# Patient Record
Sex: MALE | Race: WHITE | NOT HISPANIC OR LATINO | Employment: OTHER | URBAN - NONMETROPOLITAN AREA
[De-identification: names, ages, dates, MRNs, and addresses within clinical notes are randomized per-mention and may not be internally consistent; named-entity substitution may affect disease eponyms.]

---

## 2023-05-15 LAB
ALANINE AMINOTRANSFERASE (SGPT) (U/L) IN SER/PLAS: 22 U/L (ref 10–52)
ALBUMIN (G/DL) IN SER/PLAS: 4.3 G/DL (ref 3.4–5)
ALKALINE PHOSPHATASE (U/L) IN SER/PLAS: 60 U/L (ref 33–136)
ANION GAP IN SER/PLAS: 15 MMOL/L (ref 10–20)
ASPARTATE AMINOTRANSFERASE (SGOT) (U/L) IN SER/PLAS: 23 U/L (ref 9–39)
BASOPHILS (10*3/UL) IN BLOOD BY AUTOMATED COUNT: 0.04 X10E9/L (ref 0–0.1)
BASOPHILS/100 LEUKOCYTES IN BLOOD BY AUTOMATED COUNT: 0.6 % (ref 0–2)
BILIRUBIN TOTAL (MG/DL) IN SER/PLAS: 0.5 MG/DL (ref 0–1.2)
CALCIUM (MG/DL) IN SER/PLAS: 9.3 MG/DL (ref 8.6–10.3)
CARBON DIOXIDE, TOTAL (MMOL/L) IN SER/PLAS: 27 MMOL/L (ref 21–32)
CHLORIDE (MMOL/L) IN SER/PLAS: 101 MMOL/L (ref 98–107)
CHOLESTEROL (MG/DL) IN SER/PLAS: 129 MG/DL (ref 0–199)
CHOLESTEROL IN HDL (MG/DL) IN SER/PLAS: 53.8 MG/DL
CHOLESTEROL/HDL RATIO: 2.4
CREATININE (MG/DL) IN SER/PLAS: 0.81 MG/DL (ref 0.5–1.3)
EOSINOPHILS (10*3/UL) IN BLOOD BY AUTOMATED COUNT: 0.16 X10E9/L (ref 0–0.7)
EOSINOPHILS/100 LEUKOCYTES IN BLOOD BY AUTOMATED COUNT: 2.3 % (ref 0–6)
ERYTHROCYTE DISTRIBUTION WIDTH (RATIO) BY AUTOMATED COUNT: 13.5 % (ref 11.5–14.5)
ERYTHROCYTE MEAN CORPUSCULAR HEMOGLOBIN CONCENTRATION (G/DL) BY AUTOMATED: 33.7 G/DL (ref 32–36)
ERYTHROCYTE MEAN CORPUSCULAR VOLUME (FL) BY AUTOMATED COUNT: 92 FL (ref 80–100)
ERYTHROCYTES (10*6/UL) IN BLOOD BY AUTOMATED COUNT: 4.53 X10E12/L (ref 4.5–5.9)
GFR MALE: >90 ML/MIN/1.73M2
GLUCOSE (MG/DL) IN SER/PLAS: 71 MG/DL (ref 74–99)
HEMATOCRIT (%) IN BLOOD BY AUTOMATED COUNT: 41.9 % (ref 41–52)
HEMOGLOBIN (G/DL) IN BLOOD: 14.1 G/DL (ref 13.5–17.5)
IMMATURE GRANULOCYTES/100 LEUKOCYTES IN BLOOD BY AUTOMATED COUNT: 0.3 % (ref 0–0.9)
LDL: 45 MG/DL (ref 0–99)
LEUKOCYTES (10*3/UL) IN BLOOD BY AUTOMATED COUNT: 6.9 X10E9/L (ref 4.4–11.3)
LYMPHOCYTES (10*3/UL) IN BLOOD BY AUTOMATED COUNT: 1.27 X10E9/L (ref 1.2–4.8)
LYMPHOCYTES/100 LEUKOCYTES IN BLOOD BY AUTOMATED COUNT: 18.3 % (ref 13–44)
MONOCYTES (10*3/UL) IN BLOOD BY AUTOMATED COUNT: 0.62 X10E9/L (ref 0.1–1)
MONOCYTES/100 LEUKOCYTES IN BLOOD BY AUTOMATED COUNT: 8.9 % (ref 2–10)
NEUTROPHILS (10*3/UL) IN BLOOD BY AUTOMATED COUNT: 4.83 X10E9/L (ref 1.2–7.7)
NEUTROPHILS/100 LEUKOCYTES IN BLOOD BY AUTOMATED COUNT: 69.6 % (ref 40–80)
PLATELETS (10*3/UL) IN BLOOD AUTOMATED COUNT: 235 X10E9/L (ref 150–450)
POTASSIUM (MMOL/L) IN SER/PLAS: 4.1 MMOL/L (ref 3.5–5.3)
PROTEIN TOTAL: 6.6 G/DL (ref 6.4–8.2)
SODIUM (MMOL/L) IN SER/PLAS: 139 MMOL/L (ref 136–145)
TRIGLYCERIDE (MG/DL) IN SER/PLAS: 152 MG/DL (ref 0–149)
UREA NITROGEN (MG/DL) IN SER/PLAS: 12 MG/DL (ref 6–23)
VLDL: 30 MG/DL (ref 0–40)

## 2023-05-16 PROBLEM — N52.9 ED (ERECTILE DYSFUNCTION): Status: ACTIVE | Noted: 2023-05-16

## 2023-05-16 PROBLEM — E78.00 HYPERCHOLESTEREMIA: Status: ACTIVE | Noted: 2023-05-16

## 2023-05-16 PROBLEM — N40.0 BPH (BENIGN PROSTATIC HYPERPLASIA): Status: ACTIVE | Noted: 2023-05-16

## 2023-05-16 PROBLEM — J30.9 ALLERGIC RHINITIS: Status: ACTIVE | Noted: 2023-05-16

## 2023-05-16 PROBLEM — E55.9 VITAMIN D DEFICIENCY: Status: ACTIVE | Noted: 2023-05-16

## 2023-05-16 PROBLEM — I10 BENIGN HYPERTENSION: Status: ACTIVE | Noted: 2023-05-16

## 2023-05-16 LAB
CALCIDIOL (25 OH VITAMIN D3) (NG/ML) IN SER/PLAS: 55 NG/ML
THYROTROPIN (MIU/L) IN SER/PLAS BY DETECTION LIMIT <= 0.05 MIU/L: 1.67 MIU/L (ref 0.44–3.98)

## 2023-05-16 RX ORDER — LEVOCETIRIZINE DIHYDROCHLORIDE 5 MG/1
1 TABLET, FILM COATED ORAL EVERY EVENING
COMMUNITY
Start: 2022-05-11 | End: 2023-10-25 | Stop reason: WASHOUT

## 2023-05-16 RX ORDER — AMLODIPINE BESYLATE 10 MG/1
1 TABLET ORAL DAILY
COMMUNITY
Start: 2017-04-03 | End: 2023-07-17 | Stop reason: SDUPTHER

## 2023-05-16 RX ORDER — LOSARTAN POTASSIUM AND HYDROCHLOROTHIAZIDE 25; 100 MG/1; MG/1
1 TABLET ORAL DAILY
COMMUNITY
Start: 2016-10-17 | End: 2023-07-17 | Stop reason: SDUPTHER

## 2023-05-16 RX ORDER — TADALAFIL 5 MG/1
1 TABLET ORAL DAILY
COMMUNITY
Start: 2021-10-27 | End: 2023-11-01 | Stop reason: SDUPTHER

## 2023-05-16 RX ORDER — ACETAMINOPHEN 500 MG
50 TABLET ORAL DAILY
COMMUNITY
Start: 2019-05-07

## 2023-05-16 RX ORDER — FINASTERIDE 5 MG/1
1 TABLET, FILM COATED ORAL DAILY
COMMUNITY
End: 2023-10-25 | Stop reason: WASHOUT

## 2023-05-16 RX ORDER — ATORVASTATIN CALCIUM 20 MG/1
1 TABLET, FILM COATED ORAL NIGHTLY
COMMUNITY
End: 2023-07-17 | Stop reason: SDUPTHER

## 2023-05-22 ENCOUNTER — OFFICE VISIT (OUTPATIENT)
Dept: PRIMARY CARE | Facility: CLINIC | Age: 73
End: 2023-05-22
Payer: MEDICARE

## 2023-05-22 VITALS
HEIGHT: 70 IN | WEIGHT: 193.6 LBS | SYSTOLIC BLOOD PRESSURE: 132 MMHG | OXYGEN SATURATION: 96 % | DIASTOLIC BLOOD PRESSURE: 84 MMHG | BODY MASS INDEX: 27.72 KG/M2 | TEMPERATURE: 97.2 F | HEART RATE: 72 BPM

## 2023-05-22 DIAGNOSIS — M54.6 CHRONIC THORACIC BACK PAIN, UNSPECIFIED BACK PAIN LATERALITY: ICD-10-CM

## 2023-05-22 DIAGNOSIS — E78.00 HYPERCHOLESTEREMIA: ICD-10-CM

## 2023-05-22 DIAGNOSIS — I10 BENIGN HYPERTENSION: Primary | ICD-10-CM

## 2023-05-22 DIAGNOSIS — G89.29 CHRONIC THORACIC BACK PAIN, UNSPECIFIED BACK PAIN LATERALITY: ICD-10-CM

## 2023-05-22 PROBLEM — M54.9 BACK PAIN: Status: ACTIVE | Noted: 2023-05-22

## 2023-05-22 PROCEDURE — 3075F SYST BP GE 130 - 139MM HG: CPT | Performed by: NURSE PRACTITIONER

## 2023-05-22 PROCEDURE — 1036F TOBACCO NON-USER: CPT | Performed by: NURSE PRACTITIONER

## 2023-05-22 PROCEDURE — 1160F RVW MEDS BY RX/DR IN RCRD: CPT | Performed by: NURSE PRACTITIONER

## 2023-05-22 PROCEDURE — 3079F DIAST BP 80-89 MM HG: CPT | Performed by: NURSE PRACTITIONER

## 2023-05-22 PROCEDURE — 1159F MED LIST DOCD IN RCRD: CPT | Performed by: NURSE PRACTITIONER

## 2023-05-22 PROCEDURE — 99214 OFFICE O/P EST MOD 30 MIN: CPT | Performed by: NURSE PRACTITIONER

## 2023-05-22 ASSESSMENT — ENCOUNTER SYMPTOMS
DEPRESSION: 0
DIZZINESS: 0
ACTIVITY CHANGE: 0
OCCASIONAL FEELINGS OF UNSTEADINESS: 0
LOSS OF SENSATION IN FEET: 0
HEMATOLOGIC/LYMPHATIC NEGATIVE: 1
SORE THROAT: 0
PALPITATIONS: 0
HEADACHES: 0
ENDOCRINE NEGATIVE: 1
SPEECH DIFFICULTY: 0
VOMITING: 0
GASTROINTESTINAL NEGATIVE: 1
FATIGUE: 0
ARTHRALGIAS: 1
DIARRHEA: 0
UNEXPECTED WEIGHT CHANGE: 0
ABDOMINAL PAIN: 0
PSYCHIATRIC NEGATIVE: 1
NUMBNESS: 0
EYES NEGATIVE: 1
COUGH: 0
CONSTIPATION: 0
BACK PAIN: 1
ALLERGIC/IMMUNOLOGIC NEGATIVE: 1
NAUSEA: 0
WHEEZING: 0
SHORTNESS OF BREATH: 0
CHILLS: 0

## 2023-05-22 ASSESSMENT — PATIENT HEALTH QUESTIONNAIRE - PHQ9
1. LITTLE INTEREST OR PLEASURE IN DOING THINGS: NOT AT ALL
2. FEELING DOWN, DEPRESSED OR HOPELESS: NOT AT ALL
SUM OF ALL RESPONSES TO PHQ9 QUESTIONS 1 AND 2: 0

## 2023-05-22 NOTE — ASSESSMENT & PLAN NOTE
Controlled. Continue current medications.  -Low fat/low cholesterol diet  -Eat more fresh foods  -Avoid processed/prepackaged/fast foods  -Gradually increase physical activity

## 2023-05-22 NOTE — PROGRESS NOTES
Subjective   Patient ID: Prashant Rapp is a 72 y.o. male who presents for Follow-up (6 months,/Saw Dr Bela newton physical therapy still has pain in back going again today).    Allergic rhinitis, taking Xyzal as needed. Chronic nasal congestion, patient had radiofrequency application to the inferior nasal turbinates  Following with Dr. Rice for BPH, ED. Taking tadalafil 5 mg daily  Hypertension, controlled with amlodipine 10 mg daily, losartan-HCTZ 100-25 mg daily.   Cervicalgia, controlled with NSAIDs. Recent back pain, seeing ortho, going to PT.  Bilateral hearing loss, saw audiology, asymmetric SNHL, bilateral hearing aids.  Follows with Dr. Romero for dermatology.  Intermittent left knee pain. Recommend compression sleeve. Tylenol as needed    Preventive:  CRC screen: 2018  PSA: 2022--0.4  CT cardiac scorin2018--17.9    Orders Only on 05/15/2023  Glucose                                       Date: 05/15/2023  Value: 71 (L)      Ref range: 74 - 99 mg/dL      Status: Final  Sodium                                        Date: 05/15/2023  Value: 139         Ref range: 136 - 145 mmol/L   Status: Final  Potassium                                     Date: 05/15/2023  Value: 4.1         Ref range: 3.5 - 5.3 mmol/L   Status: Final  Chloride                                      Date: 05/15/2023  Value: 101         Ref range: 98 - 107 mmol/L    Status: Final  Bicarbonate                                   Date: 05/15/2023  Value: 27          Ref range: 21 - 32 mmol/L     Status: Final  Anion Gap                                     Date: 05/15/2023  Value: 15          Ref range: 10 - 20 mmol/L     Status: Final  Urea Nitrogen                                 Date: 05/15/2023  Value: 12          Ref range: 6 - 23 mg/dL       Status: Final  Creatinine                                    Date: 05/15/2023  Value: 0.81        Ref range: 0.50 - 1.30 mg/dL  Status: Final  GFR MALE                                       Date: 05/15/2023  Value: >90         Ref range: >90 mL/min/1.73m2  Status: Final                Comment:  CALCULATIONS OF ESTIMATED GFR ARE PERFORMED   USING THE 2021 CKD-EPI STUDY REFIT EQUATION   WITHOUT THE RACE VARIABLE FOR THE IDMS-TRACEABLE   CREATININE METHODS.    https://jasn.asnjournals.org/content/early/2021/09/22/ASN.[email or web address]    Calcium                                       Date: 05/15/2023  Value: 9.3         Ref range: 8.6 - 10.3 mg/dL   Status: Final  Albumin                                       Date: 05/15/2023  Value: 4.3         Ref range: 3.4 - 5.0 g/dL     Status: Final  Alkaline Phosphatase                          Date: 05/15/2023  Value: 60          Ref range: 33 - 136 U/L       Status: Final  Total Protein                                 Date: 05/15/2023  Value: 6.6         Ref range: 6.4 - 8.2 g/dL     Status: Final  AST                                           Date: 05/15/2023  Value: 23          Ref range: 9 - 39 U/L         Status: Final  Total Bilirubin                               Date: 05/15/2023  Value: 0.5         Ref range: 0.0 - 1.2 mg/dL    Status: Final  ALT (SGPT)                                    Date: 05/15/2023  Value: 22          Ref range: 10 - 52 U/L        Status: Final                Comment:  Patients treated with Sulfasalazine may generate    falsely decreased results for ALT.    ------------  Orders Only on 05/15/2023  Cholesterol                                   Date: 05/15/2023  Value: 129         Ref range: 0 - 199 mg/dL      Status: Final                Comment: .      AGE      DESIRABLE   BORDERLINE HIGH   HIGH     0-19 Y     0 - 169       170 - 199     >/= 200    20-24 Y     0 - 189       190 - 224     >/= 225         >24 Y     0 - 199       200 - 239     >/= 240   **All ranges are based on fasting samples. Specific   therapeutic targets will vary based on patient-specific   cardiac risk.  .   Pediatric guidelines reference:Pediatrics 2011, 128(S5).    Adult guidelines reference: NCEP ATPIII Guidelines,     BLESSING 2001, 258:2486-97  .   Venipuncture immediately after or during the    administration of Metamizole may lead to falsely   low results. Testing should be performed immediately   prior to Metamizole dosing.    HDL                                           Date: 05/15/2023  Value: 53.8        Ref range: mg/dL              Status: Final                Comment: .      AGE      VERY LOW   LOW     NORMAL    HIGH       0-19 Y       < 35   < 40     40-45     ----    20-24 Y       ----   < 40       >45     ----      >24 Y       ----   < 40     40-60      >60  .    Cholesterol/HDL Ratio                         Date: 05/15/2023  Value: 2.4           Status: Final                Comment: REF VALUES  DESIRABLE  < 3.4  HIGH RISK  > 5.0    LDL                                           Date: 05/15/2023  Value: 45          Ref range: 0 - 99 mg/dL       Status: Final                Comment: .                           NEAR      BORD      AGE      DESIRABLE  OPTIMAL    HIGH     HIGH     VERY HIGH     0-19 Y     0 - 109     ---    110-129   >/= 130     ----    20-24 Y     0 - 119     ---    120-159   >/= 160     ----      >24 Y     0 -  99   100-129  130-159   160-189     >/=190  .    VLDL                                          Date: 05/15/2023  Value: 30          Ref range: 0 - 40 mg/dL       Status: Final  Triglycerides                                 Date: 05/15/2023  Value: 152 (H)     Ref range: 0 - 149 mg/dL      Status: Final                Comment: .      AGE      DESIRABLE   BORDERLINE HIGH   HIGH     VERY HIGH   0 D-90 D    19 - 174         ----         ----        ----  91 D- 9 Y     0 -  74        75 -  99     >/= 100      ----    10-19 Y     0 -  89        90 - 129     >/= 130      ----    20-24 Y     0 - 114       115 - 149     >/= 150      ----         >24 Y     0 - 149       150 - 199    200- 499    >/= 500  .   Venipuncture immediately after or during the     administration of Metamizole may lead to falsely   low results. Testing should be performed immediately   prior to Metamizole dosing.    ------------  Orders Only on 05/15/2023  TSH                                           Date: 05/15/2023  Value: 1.67        Ref range: 0.44 - 3.98 mIU/L  Status: Final                Comment:  TSH testing is performed using different testing    methodology at [facility] than at other    [facility]. Direct result comparisons should    only be made within the same method.    ------------  Orders Only on 05/15/2023  WBC                                           Date: 05/15/2023  Value: 6.9         Ref range: 4.4 - 11.3 x10E9*  Status: Final  RBC                                           Date: 05/15/2023  Value: 4.53        Ref range: 4.50 - 5.90 x10E*  Status: Final  Hemoglobin                                    Date: 05/15/2023  Value: 14.1        Ref range: 13.5 - 17.5 g/dL   Status: Final  Hematocrit                                    Date: 05/15/2023  Value: 41.9        Ref range: 41.0 - 52.0 %      Status: Final  MCV                                           Date: 05/15/2023  Value: 92          Ref range: 80 - 100 fL        Status: Final  MCHC                                          Date: 05/15/2023  Value: 33.7        Ref range: 32.0 - 36.0 g/dL   Status: Final  Platelets                                     Date: 05/15/2023  Value: 235         Ref range: 150 - 450 x10E9/L  Status: Final  RDW                                           Date: 05/15/2023  Value: 13.5        Ref range: 11.5 - 14.5 %      Status: Final  Neutrophils %                                 Date: 05/15/2023  Value: 69.6        Ref range: 40.0 - 80.0 %      Status: Final  Immature Granulocytes %, Automated            Date: 05/15/2023  Value: 0.3         Ref range: 0.0 - 0.9 %        Status: Final                Comment:  Immature Granulocyte Count (IG) includes promyelocytes,    myelocytes  and metamyelocytes but does not include bands.   Percent differential counts (%) should be interpreted in the   context of the absolute cell counts (cells/L).    Lymphocytes %                                 Date: 05/15/2023  Value: 18.3        Ref range: 13.0 - 44.0 %      Status: Final  Monocytes %                                   Date: 05/15/2023  Value: 8.9         Ref range: 2.0 - 10.0 %       Status: Final  Eosinophils %                                 Date: 05/15/2023  Value: 2.3         Ref range: 0.0 - 6.0 %        Status: Final  Basophils %                                   Date: 05/15/2023  Value: 0.6         Ref range: 0.0 - 2.0 %        Status: Final  Neutrophils Absolute                          Date: 05/15/2023  Value: 4.83        Ref range: 1.20 - 7.70 x10E*  Status: Final  Lymphocytes Absolute                          Date: 05/15/2023  Value: 1.27        Ref range: 1.20 - 4.80 x10E*  Status: Final  Monocytes Absolute                            Date: 05/15/2023  Value: 0.62        Ref range: 0.10 - 1.00 x10E*  Status: Final  Eosinophils Absolute                          Date: 05/15/2023  Value: 0.16        Ref range: 0.00 - 0.70 x10E*  Status: Final  Basophils Absolute                            Date: 05/15/2023  Value: 0.04        Ref range: 0.00 - 0.10 x10E*  Status: Final  ------------  Orders Only on 05/15/2023  Vitamin D, 25-Hydroxy                         Date: 05/15/2023  Value: 55          Ref range: ng/mL              Status: Final                Comment: .  DEFICIENCY:         < 20   NG/ML  INSUFFICIENCY:      20-29  NG/ML  SUFFICIENCY:         NG/ML    THIS ASSAY ACCURATELY QUANTIFIES THE SUM OF  VITAMIN D3, 25-HYDROXY AND VIT D2,25-HYDROXY.    ------------  [facility] Encounter on 11/15/2022  PSA                                           Date: 11/15/2022  Value: 3.0         Ref range: 0.0 - 4.0 ng/mL    Status: Final                Comment: INTERPRETIVE INFORMATION: Prostate Specific  Antigen  The Roche PSA electrochemiluminescent immunoassay is used. Results   obtained with different test methods or kits cannot be used   interchangeably. The Roche PSA method is approved for use as an   aid in the detection of prostate cancer when used in conjunction   with a digital rectal exam in individuals with a prostate age 50   years and older. The Roche PSA is also indicated for the serial   measurement of PSA to aid in the prognosis and management of   prostate cancer patients. Elevated PSA concentrations can only   suggest the presence of prostate cancer until biopsy is performed.   PSA concentrations can also be elevated in benign prostatic   hyperplasia or inflammatory conditions of the prostate. PSA is   generally not elevated in healthy individuals or individuals with   nonprostatic carcinoma.    PSA, Free                                     Date: 11/15/2022  Value: 0.4         Ref range: ng/mL              Status: Final  PSA, Free Pct                                 Date: 11/15/2022  Value: 13          Ref range: %                  Status: Final                Comment: INTERPRETIVE INFORMATION: Prostate Specific Antigen, Free                            Percentage  [facility] uses the Roche Free PSA electrochemiluminescent immunoassay   method in conjunction with the Roche PSA electrochemiluminescent   immunoassay method to determine the free PSA percentage. Values   obtained with different assay methods should not be used   interchangeably. The free PSA percentage is an aid in   distinguishing prostate cancer from benign prostatic conditions in   individuals with a prostate age 50 years and older with a total   PSA between 3 and 10 ng/mL and negative digital rectal examination   findings. Prostatic biopsy is required for the diagnosis of   cancer.   In patients with total PSA concentrations of 4-10 ng/mL, the   probability of finding prostate cancer on needle biopsy by age in   years is:  %fPSA            "    50-59    60-69    70 or older  0  - 10%            49%      58%      65%  11 - 18%            27%      34%      41%  19 - 25%            18%      24%                               30%  Greater than 25%     9%      12%      16%  Other factors may help determine the actual risk of prostate   cancer in individual patients.  Performed By: [facility]  [address] [place] [address]  : Harley Mata MD, PhD    ------------          Review of Systems   Constitutional:  Negative for activity change, chills, fatigue and unexpected weight change.   HENT:  Positive for hearing loss. Negative for congestion, ear pain and sore throat.    Eyes: Negative.    Respiratory:  Negative for cough, shortness of breath and wheezing.    Cardiovascular:  Negative for chest pain, palpitations and leg swelling.   Gastrointestinal: Negative.  Negative for abdominal pain, constipation, diarrhea, nausea and vomiting.   Endocrine: Negative.    Genitourinary: Negative.    Musculoskeletal:  Positive for arthralgias and back pain.   Skin: Negative.    Allergic/Immunologic: Negative.    Neurological:  Negative for dizziness, speech difficulty, numbness and headaches.   Hematological: Negative.    Psychiatric/Behavioral: Negative.     All other systems reviewed and are negative.      Objective   /84   Pulse 72   Temp 36.2 °C (97.2 °F)   Ht 1.778 m (5' 10\")   Wt 87.8 kg (193 lb 9.6 oz)   SpO2 96%   BMI 27.78 kg/m²     Physical Exam  Vitals and nursing note reviewed.   Constitutional:       General: He is not in acute distress.     Appearance: Normal appearance. He is normal weight. He is not ill-appearing.   HENT:      Head: Normocephalic and atraumatic.      Right Ear: Tympanic membrane, ear canal and external ear normal.      Left Ear: Tympanic membrane, ear canal and external ear normal.      Nose: Nose normal.      Mouth/Throat:      Mouth: Mucous membranes are moist.      Pharynx: " Oropharynx is clear.   Eyes:      Extraocular Movements: Extraocular movements intact.      Conjunctiva/sclera: Conjunctivae normal.      Pupils: Pupils are equal, round, and reactive to light.   Cardiovascular:      Rate and Rhythm: Normal rate and regular rhythm.      Pulses: Normal pulses.      Heart sounds: Normal heart sounds. No murmur heard.  Pulmonary:      Effort: Pulmonary effort is normal. No respiratory distress.      Breath sounds: Normal breath sounds. No wheezing.   Abdominal:      General: Bowel sounds are normal. There is no distension.      Palpations: Abdomen is soft.      Tenderness: There is no abdominal tenderness.   Musculoskeletal:         General: No tenderness. Normal range of motion.      Cervical back: Normal range of motion and neck supple.      Right lower leg: No edema.      Left lower leg: No edema.   Skin:     General: Skin is warm and dry.      Capillary Refill: Capillary refill takes less than 2 seconds.   Neurological:      General: No focal deficit present.      Mental Status: He is alert and oriented to person, place, and time.   Psychiatric:         Mood and Affect: Mood normal.         Behavior: Behavior normal.         Thought Content: Thought content normal.         Judgment: Judgment normal.         Assessment/Plan   Problem List Items Addressed This Visit          Circulatory    Benign hypertension - Primary     Controlled. Continue current medications.  -Low fat/cholesterol, low sodium, low carbohydrate diet  -Exercise as tolerated 150 minutes/week, increase activity slowly  -Maintain healthy weight         Relevant Orders    Follow Up In Primary Care       Other    Hypercholesteremia     Controlled. Continue current medications.  -Low fat/low cholesterol diet  -Eat more fresh foods  -Avoid processed/prepackaged/fast foods  -Gradually increase physical activity           Relevant Orders    Follow Up In Primary Care    Back pain     Improving  Following with   [facility]  Receiving physical therapy

## 2023-07-17 DIAGNOSIS — E78.00 HYPERCHOLESTEREMIA: ICD-10-CM

## 2023-07-17 DIAGNOSIS — I10 BENIGN HYPERTENSION: ICD-10-CM

## 2023-07-17 RX ORDER — AMLODIPINE BESYLATE 10 MG/1
10 TABLET ORAL DAILY
Qty: 90 TABLET | Refills: 0 | Status: SHIPPED | OUTPATIENT
Start: 2023-07-17 | End: 2023-10-12 | Stop reason: SDUPTHER

## 2023-07-17 RX ORDER — LOSARTAN POTASSIUM AND HYDROCHLOROTHIAZIDE 25; 100 MG/1; MG/1
1 TABLET ORAL DAILY
Qty: 90 TABLET | Refills: 0 | Status: SHIPPED | OUTPATIENT
Start: 2023-07-17 | End: 2023-10-12 | Stop reason: SDUPTHER

## 2023-07-17 RX ORDER — ATORVASTATIN CALCIUM 20 MG/1
20 TABLET, FILM COATED ORAL NIGHTLY
Qty: 90 TABLET | Refills: 0 | Status: SHIPPED | OUTPATIENT
Start: 2023-07-17 | End: 2023-10-12 | Stop reason: SDUPTHER

## 2023-10-12 DIAGNOSIS — I10 BENIGN HYPERTENSION: ICD-10-CM

## 2023-10-12 DIAGNOSIS — E78.00 HYPERCHOLESTEREMIA: ICD-10-CM

## 2023-10-12 RX ORDER — ATORVASTATIN CALCIUM 20 MG/1
20 TABLET, FILM COATED ORAL NIGHTLY
Qty: 90 TABLET | Refills: 1 | Status: SHIPPED | OUTPATIENT
Start: 2023-10-12 | End: 2024-03-13 | Stop reason: SDUPTHER

## 2023-10-12 RX ORDER — AMLODIPINE BESYLATE 10 MG/1
10 TABLET ORAL DAILY
Qty: 90 TABLET | Refills: 1 | Status: SHIPPED | OUTPATIENT
Start: 2023-10-12 | End: 2024-03-13 | Stop reason: SDUPTHER

## 2023-10-12 RX ORDER — LOSARTAN POTASSIUM AND HYDROCHLOROTHIAZIDE 25; 100 MG/1; MG/1
1 TABLET ORAL DAILY
Qty: 90 TABLET | Refills: 1 | Status: SHIPPED | OUTPATIENT
Start: 2023-10-12 | End: 2024-03-13 | Stop reason: SDUPTHER

## 2023-10-25 ENCOUNTER — OFFICE VISIT (OUTPATIENT)
Dept: PRIMARY CARE | Facility: CLINIC | Age: 73
End: 2023-10-25
Payer: MEDICARE

## 2023-10-25 ENCOUNTER — APPOINTMENT (OUTPATIENT)
Dept: UROLOGY | Facility: CLINIC | Age: 73
End: 2023-10-25
Payer: MEDICARE

## 2023-10-25 VITALS
DIASTOLIC BLOOD PRESSURE: 70 MMHG | OXYGEN SATURATION: 97 % | WEIGHT: 194.7 LBS | TEMPERATURE: 96.9 F | SYSTOLIC BLOOD PRESSURE: 130 MMHG | BODY MASS INDEX: 27.94 KG/M2 | HEART RATE: 58 BPM

## 2023-10-25 DIAGNOSIS — R21 RASH: Primary | ICD-10-CM

## 2023-10-25 PROCEDURE — 1159F MED LIST DOCD IN RCRD: CPT | Performed by: NURSE PRACTITIONER

## 2023-10-25 PROCEDURE — 1036F TOBACCO NON-USER: CPT | Performed by: NURSE PRACTITIONER

## 2023-10-25 PROCEDURE — 3078F DIAST BP <80 MM HG: CPT | Performed by: NURSE PRACTITIONER

## 2023-10-25 PROCEDURE — 99213 OFFICE O/P EST LOW 20 MIN: CPT | Performed by: NURSE PRACTITIONER

## 2023-10-25 PROCEDURE — 1160F RVW MEDS BY RX/DR IN RCRD: CPT | Performed by: NURSE PRACTITIONER

## 2023-10-25 PROCEDURE — 3075F SYST BP GE 130 - 139MM HG: CPT | Performed by: NURSE PRACTITIONER

## 2023-10-25 RX ORDER — TRIAMCINOLONE ACETONIDE 1 MG/G
CREAM TOPICAL 2 TIMES DAILY
Qty: 30 G | Refills: 0 | Status: SHIPPED | OUTPATIENT
Start: 2023-10-25

## 2023-10-25 NOTE — PROGRESS NOTES
Subjective   Patient ID: Prashant Rapp is a 73 y.o. male who presents for Rash (Right leg, red, raised, x 5 days).    Rash since Friday back of R leg. S1 dermatome blistered rash.  Denies pain, itching.  Shingles rash.  Patient is outside for 48 to 72-hour window for valacyclovir.  Patient had Shingrix vaccine earlier this year.  Triamcinolone cream as needed for itching.  Instructed to call the office if symptoms worsen or do not improve.         Review of Systems   Constitutional:  Negative for activity change, chills, fatigue and unexpected weight change.   HENT:  Positive for hearing loss. Negative for congestion, ear pain and sore throat.    Eyes: Negative.    Respiratory:  Negative for cough, shortness of breath and wheezing.    Cardiovascular:  Negative for chest pain, palpitations and leg swelling.   Gastrointestinal: Negative.  Negative for abdominal pain, constipation, diarrhea, nausea and vomiting.   Endocrine: Negative.    Genitourinary: Negative.    Musculoskeletal:  Positive for arthralgias and back pain.   Skin:  Positive for rash.   Allergic/Immunologic: Negative.    Neurological:  Negative for dizziness, speech difficulty, numbness and headaches.   Hematological: Negative.    Psychiatric/Behavioral: Negative.     All other systems reviewed and are negative.      Objective   /70   Pulse 58   Temp 36.1 °C (96.9 °F)   Wt 88.3 kg (194 lb 11.2 oz)   SpO2 97%   BMI 27.94 kg/m²     Physical Exam  Vitals and nursing note reviewed.   Constitutional:       General: He is not in acute distress.     Appearance: Normal appearance. He is normal weight. He is not ill-appearing.   HENT:      Head: Normocephalic and atraumatic.      Right Ear: Tympanic membrane, ear canal and external ear normal.      Left Ear: Tympanic membrane, ear canal and external ear normal.      Nose: Nose normal.      Mouth/Throat:      Mouth: Mucous membranes are moist.      Pharynx: Oropharynx is clear.   Eyes:       Extraocular Movements: Extraocular movements intact.      Conjunctiva/sclera: Conjunctivae normal.      Pupils: Pupils are equal, round, and reactive to light.   Cardiovascular:      Rate and Rhythm: Normal rate and regular rhythm.      Pulses: Normal pulses.      Heart sounds: Normal heart sounds. No murmur heard.  Pulmonary:      Effort: Pulmonary effort is normal. No respiratory distress.      Breath sounds: Normal breath sounds. No wheezing.   Abdominal:      General: Bowel sounds are normal. There is no distension.      Palpations: Abdomen is soft.      Tenderness: There is no abdominal tenderness.   Musculoskeletal:         General: No tenderness. Normal range of motion.      Cervical back: Normal range of motion and neck supple.      Right lower leg: No edema.      Left lower leg: No edema.   Skin:     General: Skin is warm and dry.      Capillary Refill: Capillary refill takes less than 2 seconds.      Findings: Rash present. Rash is vesicular (posterior R leg dermatome S1 shingles rash).   Neurological:      General: No focal deficit present.      Mental Status: He is alert and oriented to person, place, and time.   Psychiatric:         Mood and Affect: Mood normal.         Behavior: Behavior normal.         Thought Content: Thought content normal.         Judgment: Judgment normal.         Assessment/Plan     #Shingles x5 days  -Tylenol 650-1000 mg every 8 hours as needed for pain  -Triamcinolone cream as needed for itching  -Call the office if symptoms worsen or do not improve    Follow-up as scheduled in November and as needed

## 2023-10-29 ASSESSMENT — ENCOUNTER SYMPTOMS
NUMBNESS: 0
UNEXPECTED WEIGHT CHANGE: 0
ENDOCRINE NEGATIVE: 1
GASTROINTESTINAL NEGATIVE: 1
WHEEZING: 0
PALPITATIONS: 0
DIARRHEA: 0
ACTIVITY CHANGE: 0
SHORTNESS OF BREATH: 0
BACK PAIN: 1
PSYCHIATRIC NEGATIVE: 1
ABDOMINAL PAIN: 0
NAUSEA: 0
EYES NEGATIVE: 1
CONSTIPATION: 0
CHILLS: 0
SPEECH DIFFICULTY: 0
ARTHRALGIAS: 1
FATIGUE: 0
SORE THROAT: 0
DIZZINESS: 0
HEADACHES: 0
ALLERGIC/IMMUNOLOGIC NEGATIVE: 1
COUGH: 0
HEMATOLOGIC/LYMPHATIC NEGATIVE: 1
VOMITING: 0

## 2023-11-01 ENCOUNTER — OFFICE VISIT (OUTPATIENT)
Dept: UROLOGY | Facility: CLINIC | Age: 73
End: 2023-11-01
Payer: MEDICARE

## 2023-11-01 DIAGNOSIS — R97.20 ELEVATED PSA MEASUREMENT: Primary | ICD-10-CM

## 2023-11-01 DIAGNOSIS — N40.0 BENIGN PROSTATIC HYPERPLASIA, UNSPECIFIED WHETHER LOWER URINARY TRACT SYMPTOMS PRESENT: ICD-10-CM

## 2023-11-01 PROCEDURE — 1160F RVW MEDS BY RX/DR IN RCRD: CPT | Performed by: STUDENT IN AN ORGANIZED HEALTH CARE EDUCATION/TRAINING PROGRAM

## 2023-11-01 PROCEDURE — 3078F DIAST BP <80 MM HG: CPT | Performed by: STUDENT IN AN ORGANIZED HEALTH CARE EDUCATION/TRAINING PROGRAM

## 2023-11-01 PROCEDURE — 3075F SYST BP GE 130 - 139MM HG: CPT | Performed by: STUDENT IN AN ORGANIZED HEALTH CARE EDUCATION/TRAINING PROGRAM

## 2023-11-01 PROCEDURE — 99214 OFFICE O/P EST MOD 30 MIN: CPT | Performed by: STUDENT IN AN ORGANIZED HEALTH CARE EDUCATION/TRAINING PROGRAM

## 2023-11-01 PROCEDURE — 1036F TOBACCO NON-USER: CPT | Performed by: STUDENT IN AN ORGANIZED HEALTH CARE EDUCATION/TRAINING PROGRAM

## 2023-11-01 PROCEDURE — 1159F MED LIST DOCD IN RCRD: CPT | Performed by: STUDENT IN AN ORGANIZED HEALTH CARE EDUCATION/TRAINING PROGRAM

## 2023-11-01 RX ORDER — TADALAFIL 5 MG/1
5 TABLET ORAL DAILY
Qty: 30 TABLET | Refills: 11 | Status: SHIPPED | OUTPATIENT
Start: 2023-11-01 | End: 2024-03-27 | Stop reason: SDUPTHER

## 2023-11-01 NOTE — PROGRESS NOTES
Subjective   Patient ID: Prashant Rapp is a 73 y.o. male.    HPI  74 yo male F/U ED and BPH. PSA rising.      He continues tadalafil 5 mg, 1 tab daily as a treatment for both, with acceptable response for both.    He is due for PSA.       May 2022 PSA 2.13  May 2021 PSA 0.54  May 2020 PSA 0.66      Lab Results   Component Value Date    PSA 3.0 11/15/2022       Review of Systems   All other systems reviewed and are negative.      Objective   Physical Exam  Vitals reviewed.         Assessment/Plan     74 yo male F/U ED and BPH. PSA rising. PSA 3 November 2022.      He continues tadalafil 5 mg, 1 tab daily as a treatment for both, with acceptable response for both.    We discussed rising PSA. We will order repeat PSA and decide if MRI is needed after results are back.     Plan:   Repeat PSA f/t.  MRI prostate to perform if PSA continues to go up.   Continue Cialis 5 mg.   FUV depending on results.     Diagnoses and all orders for this visit:  Elevated PSA measurement  -     PSA, total and free; Future  -     MR prostate [place] boundaries; Future  -     Creatinine, Serum; Future  Benign prostatic hyperplasia, unspecified whether lower urinary tract symptoms present  -     tadalafil (Cialis) 5 mg tablet; Take 1 tablet (5 mg) by mouth once daily.

## 2023-11-16 ENCOUNTER — LAB (OUTPATIENT)
Dept: LAB | Facility: LAB | Age: 73
End: 2023-11-16
Payer: MEDICARE

## 2023-11-16 DIAGNOSIS — R97.20 ELEVATED PSA MEASUREMENT: ICD-10-CM

## 2023-11-16 LAB
CREAT SERPL-MCNC: 0.8 MG/DL (ref 0.5–1.3)
GFR SERPL CREATININE-BSD FRML MDRD: >90 ML/MIN/1.73M*2

## 2023-11-16 PROCEDURE — 36415 COLL VENOUS BLD VENIPUNCTURE: CPT

## 2023-11-16 PROCEDURE — 84153 ASSAY OF PSA TOTAL: CPT

## 2023-11-16 PROCEDURE — 84154 ASSAY OF PSA FREE: CPT

## 2023-11-19 LAB
PSA FREE MFR SERPL: 22 %
PSA FREE SERPL-MCNC: 0.4 NG/ML
PSA SERPL IA-MCNC: 1.8 NG/ML (ref 0–4)

## 2023-11-22 ENCOUNTER — OFFICE VISIT (OUTPATIENT)
Dept: PRIMARY CARE | Facility: CLINIC | Age: 73
End: 2023-11-22
Payer: MEDICARE

## 2023-11-22 VITALS
HEART RATE: 65 BPM | TEMPERATURE: 97.1 F | BODY MASS INDEX: 27.33 KG/M2 | WEIGHT: 190.9 LBS | DIASTOLIC BLOOD PRESSURE: 74 MMHG | OXYGEN SATURATION: 97 % | HEIGHT: 70 IN | SYSTOLIC BLOOD PRESSURE: 136 MMHG

## 2023-11-22 DIAGNOSIS — Z00.00 ROUTINE GENERAL MEDICAL EXAMINATION AT HEALTH CARE FACILITY: Primary | ICD-10-CM

## 2023-11-22 DIAGNOSIS — Z12.5 PROSTATE CANCER SCREENING: ICD-10-CM

## 2023-11-22 DIAGNOSIS — E78.00 HYPERCHOLESTEREMIA: ICD-10-CM

## 2023-11-22 DIAGNOSIS — E55.9 VITAMIN D DEFICIENCY: ICD-10-CM

## 2023-11-22 DIAGNOSIS — N40.0 BENIGN PROSTATIC HYPERPLASIA, UNSPECIFIED WHETHER LOWER URINARY TRACT SYMPTOMS PRESENT: ICD-10-CM

## 2023-11-22 DIAGNOSIS — Z13.31 DEPRESSION SCREENING: ICD-10-CM

## 2023-11-22 DIAGNOSIS — I10 BENIGN HYPERTENSION: ICD-10-CM

## 2023-11-22 PROCEDURE — 1123F ACP DISCUSS/DSCN MKR DOCD: CPT | Performed by: NURSE PRACTITIONER

## 2023-11-22 PROCEDURE — 1159F MED LIST DOCD IN RCRD: CPT | Performed by: NURSE PRACTITIONER

## 2023-11-22 PROCEDURE — 1036F TOBACCO NON-USER: CPT | Performed by: NURSE PRACTITIONER

## 2023-11-22 PROCEDURE — G0439 PPPS, SUBSEQ VISIT: HCPCS | Performed by: NURSE PRACTITIONER

## 2023-11-22 PROCEDURE — 1160F RVW MEDS BY RX/DR IN RCRD: CPT | Performed by: NURSE PRACTITIONER

## 2023-11-22 PROCEDURE — G0444 DEPRESSION SCREEN ANNUAL: HCPCS | Performed by: NURSE PRACTITIONER

## 2023-11-22 PROCEDURE — 1170F FXNL STATUS ASSESSED: CPT | Performed by: NURSE PRACTITIONER

## 2023-11-22 PROCEDURE — 3078F DIAST BP <80 MM HG: CPT | Performed by: NURSE PRACTITIONER

## 2023-11-22 PROCEDURE — 3075F SYST BP GE 130 - 139MM HG: CPT | Performed by: NURSE PRACTITIONER

## 2023-11-22 PROCEDURE — 3008F BODY MASS INDEX DOCD: CPT | Performed by: NURSE PRACTITIONER

## 2023-11-22 ASSESSMENT — ENCOUNTER SYMPTOMS
NUMBNESS: 0
NAUSEA: 0
ARTHRALGIAS: 1
BACK PAIN: 1
SPEECH DIFFICULTY: 0
VOMITING: 0
UNEXPECTED WEIGHT CHANGE: 0
DEPRESSION: 0
CONSTIPATION: 0
SHORTNESS OF BREATH: 0
PALPITATIONS: 0
GASTROINTESTINAL NEGATIVE: 1
ALLERGIC/IMMUNOLOGIC NEGATIVE: 1
HEADACHES: 0
DIARRHEA: 0
HEMATOLOGIC/LYMPHATIC NEGATIVE: 1
PSYCHIATRIC NEGATIVE: 1
CHILLS: 0
SORE THROAT: 0
ENDOCRINE NEGATIVE: 1
LOSS OF SENSATION IN FEET: 0
EYES NEGATIVE: 1
DIZZINESS: 0
ACTIVITY CHANGE: 0
FATIGUE: 0
ABDOMINAL PAIN: 0
WHEEZING: 0
COUGH: 0
OCCASIONAL FEELINGS OF UNSTEADINESS: 0

## 2023-11-22 ASSESSMENT — ACTIVITIES OF DAILY LIVING (ADL)
GROCERY_SHOPPING: INDEPENDENT
DOING_HOUSEWORK: INDEPENDENT
DRESSING: INDEPENDENT
BATHING: INDEPENDENT
MANAGING_FINANCES: INDEPENDENT
TAKING_MEDICATION: INDEPENDENT

## 2023-11-22 NOTE — PROGRESS NOTES
Subjective   Reason for Visit: Prashant Rapp is an 73 y.o. male here for a Medicare Wellness visit.     Past Medical, Surgical, and Family History reviewed and updated in chart.    Reviewed all medications by prescribing practitioner or clinical pharmacist (such as prescriptions, OTCs, herbal therapies and supplements) and documented in the medical record.    Medicare Physical  Patient is very active, goes to [facility] for cardio and weights.  Shingles rash resolved, still has itching on occasion, uses triamcinolone as needed.  Allergic rhinitis, taking Xyzal as needed. Chronic nasal congestion, patient had radiofrequency application to the inferior nasal turbinates  Following with Dr. Rice for BPH, ED. Taking tadalafil 5 mg daily  Hypertension, controlled with amlodipine 10 mg daily, losartan-HCTZ 100-25 mg daily.   Cervicalgia, controlled with NSAIDs.   Bilateral hearing loss, saw audiology, asymmetric SNHL, bilateral hearing aids.  Follows with Dr. Romero for dermatology.  Intermittent left knee pain. Recommend compression sleeve. Tylenol as needed  Due for labs before next visit  I spent 15 minutes obtaining and discussing depression screening using PHQ-2 questions with results documented in the chart.   RSV vaccine recommended    Preventive:  CRC screen: 2018  PSA: 2023--1.8  CT cardiac scorin2018--17.9    Lab on 2023  PSA                                           Date: 2023  Value: 1.8         Ref range: 0.0 - 4.0 ng/mL    Status: Final                Comment: INTERPRETIVE INFORMATION: Prostate Specific Antigen    The Roche PSA electrochemiluminescent immunoassay is used. Results   obtained with different test methods or kits cannot be used   interchangeably. The Roche PSA method is approved for use as an   aid in the detection of prostate cancer when used in conjunction   with a digital rectal exam in individuals with a prostate age 50   years and older. The Roche PSA is also  indicated for the serial   measurement of PSA to aid in the prognosis and management of   prostate cancer patients. Elevated PSA concentrations can only   suggest the presence of prostate cancer until biopsy is performed.   PSA concentrations can also be elevated in benign prostatic   hyperplasia or inflammatory conditions of the prostate. PSA is   generally not elevated in healthy individuals or individuals with   nonprostatic carcinoma.  PSA, Free                                     Date: 11/16/2023  Value: 0.4         Ref range: ng/mL              Status: Final  PSA, Free Pct                                 Date: 11/16/2023  Value: 22          Ref range: %                  Status: Final                Comment: INTERPRETIVE INFORMATION: Prostate Specific Antigen, Free                            Percentage    [employer] uses the Roche Free PSA electrochemiluminescent immunoassay   method in conjunction with the Roche PSA electrochemiluminescent   immunoassay method to determine the free PSA percentage. Values   obtained with different assay methods should not be used   interchangeably. The free PSA percentage is an aid in   distinguishing prostate cancer from benign prostatic conditions in   individuals with a prostate age 50 years and older with a total   PSA between 3 and 10 ng/mL and negative digital rectal examination   findings. Prostatic biopsy is required for the diagnosis of   cancer.     In patients with total PSA concentrations of 4-10 ng/mL, the   probability of finding prostate cancer on needle biopsy by age in   years is:    %fPSA               50-59    60-69    70 or older  0  - 10%            49%      58%      65%  11 - 18%            27%      34%      41%  19 - 25%            18%                               24%      30%  Greater than 25%     9%      12%      16%    Other factors may help determine the actual risk of prostate   cancer in individual patients.  Performed By: [email or web address]  [address] [place] [address]  : Harley Mata MD, PhD  CLIA Number: [id number]  Creatinine                                    Date: 11/16/2023  Value: 0.80        Ref range: 0.50 - 1.30 mg/dL  Status: Final  eGFR                                          Date: 11/16/2023  Value: >90         Ref range: >60 mL/min/1.73m*  Status: Final                Comment: Calculations of estimated GFR are performed using the 2021 CKD-EPI Study Refit equation without the race variable for the IDMS-Traceable creatinine methods.  https://jasn.asnjournals.org/content/early/2021/09/22/ASN.3241887348  ------------        Patient Care Team:  SUZANNE Villafuerte-CNP, DNP as PCP - General     Review of Systems   Constitutional:  Negative for activity change, chills, fatigue and unexpected weight change.   HENT:  Positive for hearing loss. Negative for congestion, ear pain and sore throat.    Eyes: Negative.    Respiratory:  Negative for cough, shortness of breath and wheezing.    Cardiovascular:  Negative for chest pain, palpitations and leg swelling.   Gastrointestinal: Negative.  Negative for abdominal pain, constipation, diarrhea, nausea and vomiting.   Endocrine: Negative.    Genitourinary: Negative.    Musculoskeletal:  Positive for arthralgias and back pain.   Allergic/Immunologic: Negative.    Neurological:  Negative for dizziness, speech difficulty, numbness and headaches.   Hematological: Negative.    Psychiatric/Behavioral: Negative.     All other systems reviewed and are negative.      Objective   Vitals:  /74   Pulse 65   Temp 36.2 °C (97.1 °F)   Ht 1.765 m (5' 9.5\")   Wt 86.6 kg (190 lb 14.4 oz)   SpO2 97%   BMI 27.79 kg/m²       Physical Exam  Vitals and nursing note reviewed.   Constitutional:       General: He is not in acute distress.     Appearance: Normal appearance. He is normal weight. He is not ill-appearing.   HENT:      Head: Normocephalic and atraumatic.      Right Ear: " Tympanic membrane, ear canal and external ear normal.      Left Ear: Tympanic membrane, ear canal and external ear normal.      Nose: Nose normal.      Mouth/Throat:      Mouth: Mucous membranes are moist.      Pharynx: Oropharynx is clear.   Eyes:      Extraocular Movements: Extraocular movements intact.      Conjunctiva/sclera: Conjunctivae normal.      Pupils: Pupils are equal, round, and reactive to light.   Cardiovascular:      Rate and Rhythm: Normal rate and regular rhythm.      Pulses: Normal pulses.      Heart sounds: Normal heart sounds. No murmur heard.  Pulmonary:      Effort: Pulmonary effort is normal. No respiratory distress.      Breath sounds: Normal breath sounds. No wheezing.   Abdominal:      General: Bowel sounds are normal. There is no distension.      Palpations: Abdomen is soft.      Tenderness: There is no abdominal tenderness.   Musculoskeletal:         General: No tenderness. Normal range of motion.      Cervical back: Normal range of motion and neck supple.      Right lower leg: No edema.      Left lower leg: No edema.   Skin:     General: Skin is warm and dry.      Capillary Refill: Capillary refill takes less than 2 seconds.   Neurological:      General: No focal deficit present.      Mental Status: He is alert and oriented to person, place, and time.   Psychiatric:         Mood and Affect: Mood normal.         Behavior: Behavior normal.         Thought Content: Thought content normal.         Judgment: Judgment normal.         Assessment/Plan     #Hypertension, controlled  -Continue amlodipine 10 mg daily, losartan-HCTZ 100-25 mg daily  -Low fat/cholesterol, low sodium, low carbohydrate diet  -Exercise as tolerated 150 minutes/week, increase activity slowly  -Maintain healthy weight  #Hypercholesteremia, controlled  -Continue atorvastatin 20 mg daily  -Low fat/low cholesterol diet  -Eat more fresh foods  -Avoid processed/prepackaged/fast foods  -Gradually increase physical  activity  -Weight loss  #Neck pain, improved  -Follow with Ortho as needed  -Stay active  #ED and BPH  -Follow-up with urology  -Taking tadalafil 5 mg daily    Due for CBC, CMP, lipid, TSH, vitamin D before next visit  Follow-up in 6 months and as needed

## 2024-01-10 ENCOUNTER — APPOINTMENT (OUTPATIENT)
Dept: UROLOGY | Facility: CLINIC | Age: 74
End: 2024-01-10
Payer: MEDICARE

## 2024-03-13 DIAGNOSIS — E78.00 HYPERCHOLESTEREMIA: ICD-10-CM

## 2024-03-13 DIAGNOSIS — I10 BENIGN HYPERTENSION: ICD-10-CM

## 2024-03-13 RX ORDER — AMLODIPINE BESYLATE 10 MG/1
10 TABLET ORAL DAILY
Qty: 90 TABLET | Refills: 1 | Status: SHIPPED | OUTPATIENT
Start: 2024-03-13

## 2024-03-13 RX ORDER — LOSARTAN POTASSIUM AND HYDROCHLOROTHIAZIDE 25; 100 MG/1; MG/1
1 TABLET ORAL DAILY
Qty: 90 TABLET | Refills: 1 | Status: SHIPPED | OUTPATIENT
Start: 2024-03-13

## 2024-03-13 RX ORDER — ATORVASTATIN CALCIUM 20 MG/1
20 TABLET, FILM COATED ORAL NIGHTLY
Qty: 90 TABLET | Refills: 1 | Status: SHIPPED | OUTPATIENT
Start: 2024-03-13

## 2024-03-27 DIAGNOSIS — N40.0 BENIGN PROSTATIC HYPERPLASIA, UNSPECIFIED WHETHER LOWER URINARY TRACT SYMPTOMS PRESENT: ICD-10-CM

## 2024-03-27 RX ORDER — TADALAFIL 5 MG/1
5 TABLET ORAL DAILY
Qty: 90 TABLET | Refills: 3 | Status: SHIPPED | OUTPATIENT
Start: 2024-03-27 | End: 2025-03-27

## 2024-05-16 ENCOUNTER — LAB (OUTPATIENT)
Dept: LAB | Facility: LAB | Age: 74
End: 2024-05-16
Payer: MEDICARE

## 2024-05-16 DIAGNOSIS — I10 BENIGN HYPERTENSION: ICD-10-CM

## 2024-05-16 DIAGNOSIS — E78.00 HYPERCHOLESTEREMIA: ICD-10-CM

## 2024-05-16 DIAGNOSIS — E55.9 VITAMIN D DEFICIENCY: ICD-10-CM

## 2024-05-16 LAB
25(OH)D3 SERPL-MCNC: 50 NG/ML (ref 30–100)
ALBUMIN SERPL BCP-MCNC: 4.8 G/DL (ref 3.4–5)
ALP SERPL-CCNC: 65 U/L (ref 33–136)
ALT SERPL W P-5'-P-CCNC: 15 U/L (ref 10–52)
ANION GAP SERPL CALC-SCNC: 11 MMOL/L (ref 10–20)
AST SERPL W P-5'-P-CCNC: 22 U/L (ref 9–39)
BASOPHILS # BLD AUTO: 0.03 X10*3/UL (ref 0–0.1)
BASOPHILS NFR BLD AUTO: 0.5 %
BILIRUB SERPL-MCNC: 0.6 MG/DL (ref 0–1.2)
BUN SERPL-MCNC: 17 MG/DL (ref 6–23)
CALCIUM SERPL-MCNC: 10.1 MG/DL (ref 8.6–10.3)
CHLORIDE SERPL-SCNC: 102 MMOL/L (ref 98–107)
CHOLEST SERPL-MCNC: 121 MG/DL (ref 0–199)
CHOLESTEROL/HDL RATIO: 2.4
CO2 SERPL-SCNC: 30 MMOL/L (ref 21–32)
CREAT SERPL-MCNC: 0.93 MG/DL (ref 0.5–1.3)
EGFRCR SERPLBLD CKD-EPI 2021: 88 ML/MIN/1.73M*2
EOSINOPHIL # BLD AUTO: 0.1 X10*3/UL (ref 0–0.4)
EOSINOPHIL NFR BLD AUTO: 1.6 %
ERYTHROCYTE [DISTWIDTH] IN BLOOD BY AUTOMATED COUNT: 13.1 % (ref 11.5–14.5)
GLUCOSE SERPL-MCNC: 95 MG/DL (ref 74–99)
HCT VFR BLD AUTO: 45.8 % (ref 41–52)
HDLC SERPL-MCNC: 51 MG/DL
HGB BLD-MCNC: 15.6 G/DL (ref 13.5–17.5)
IMM GRANULOCYTES # BLD AUTO: 0.07 X10*3/UL (ref 0–0.5)
IMM GRANULOCYTES NFR BLD AUTO: 1.1 % (ref 0–0.9)
LDLC SERPL CALC-MCNC: 49 MG/DL
LYMPHOCYTES # BLD AUTO: 1.45 X10*3/UL (ref 0.8–3)
LYMPHOCYTES NFR BLD AUTO: 22.8 %
MCH RBC QN AUTO: 31.5 PG (ref 26–34)
MCHC RBC AUTO-ENTMCNC: 34.1 G/DL (ref 32–36)
MCV RBC AUTO: 92 FL (ref 80–100)
MONOCYTES # BLD AUTO: 0.72 X10*3/UL (ref 0.05–0.8)
MONOCYTES NFR BLD AUTO: 11.3 %
NEUTROPHILS # BLD AUTO: 4 X10*3/UL (ref 1.6–5.5)
NEUTROPHILS NFR BLD AUTO: 62.7 %
NON HDL CHOLESTEROL: 70 MG/DL (ref 0–149)
NRBC BLD-RTO: 0 /100 WBCS (ref 0–0)
PLATELET # BLD AUTO: 250 X10*3/UL (ref 150–450)
POTASSIUM SERPL-SCNC: 4.3 MMOL/L (ref 3.5–5.3)
PROT SERPL-MCNC: 7.6 G/DL (ref 6.4–8.2)
RBC # BLD AUTO: 4.96 X10*6/UL (ref 4.5–5.9)
SODIUM SERPL-SCNC: 139 MMOL/L (ref 136–145)
TRIGL SERPL-MCNC: 106 MG/DL (ref 0–149)
TSH SERPL-ACNC: 2.34 MIU/L (ref 0.44–3.98)
VLDL: 21 MG/DL (ref 0–40)
WBC # BLD AUTO: 6.4 X10*3/UL (ref 4.4–11.3)

## 2024-05-16 PROCEDURE — 84443 ASSAY THYROID STIM HORMONE: CPT

## 2024-05-16 PROCEDURE — 36415 COLL VENOUS BLD VENIPUNCTURE: CPT

## 2024-05-16 PROCEDURE — 80061 LIPID PANEL: CPT

## 2024-05-16 PROCEDURE — 82306 VITAMIN D 25 HYDROXY: CPT

## 2024-05-16 PROCEDURE — 80053 COMPREHEN METABOLIC PANEL: CPT

## 2024-05-16 PROCEDURE — 85025 COMPLETE CBC W/AUTO DIFF WBC: CPT

## 2024-05-31 ENCOUNTER — APPOINTMENT (OUTPATIENT)
Dept: PRIMARY CARE | Facility: CLINIC | Age: 74
End: 2024-05-31
Payer: MEDICARE

## 2024-06-10 ENCOUNTER — OFFICE VISIT (OUTPATIENT)
Dept: PRIMARY CARE | Facility: CLINIC | Age: 74
End: 2024-06-10
Payer: MEDICARE

## 2024-06-10 VITALS
DIASTOLIC BLOOD PRESSURE: 68 MMHG | HEART RATE: 62 BPM | OXYGEN SATURATION: 97 % | BODY MASS INDEX: 27.67 KG/M2 | TEMPERATURE: 96.5 F | SYSTOLIC BLOOD PRESSURE: 138 MMHG | WEIGHT: 190.1 LBS

## 2024-06-10 DIAGNOSIS — J01.90 ACUTE NON-RECURRENT SINUSITIS, UNSPECIFIED LOCATION: Primary | ICD-10-CM

## 2024-06-10 DIAGNOSIS — I10 BENIGN HYPERTENSION: ICD-10-CM

## 2024-06-10 DIAGNOSIS — J30.9 ALLERGIC RHINITIS, UNSPECIFIED SEASONALITY, UNSPECIFIED TRIGGER: ICD-10-CM

## 2024-06-10 DIAGNOSIS — E78.00 HYPERCHOLESTEREMIA: ICD-10-CM

## 2024-06-10 PROCEDURE — 3075F SYST BP GE 130 - 139MM HG: CPT | Performed by: NURSE PRACTITIONER

## 2024-06-10 PROCEDURE — 1160F RVW MEDS BY RX/DR IN RCRD: CPT | Performed by: NURSE PRACTITIONER

## 2024-06-10 PROCEDURE — 1157F ADVNC CARE PLAN IN RCRD: CPT | Performed by: NURSE PRACTITIONER

## 2024-06-10 PROCEDURE — 1036F TOBACCO NON-USER: CPT | Performed by: NURSE PRACTITIONER

## 2024-06-10 PROCEDURE — 3008F BODY MASS INDEX DOCD: CPT | Performed by: NURSE PRACTITIONER

## 2024-06-10 PROCEDURE — 99214 OFFICE O/P EST MOD 30 MIN: CPT | Performed by: NURSE PRACTITIONER

## 2024-06-10 PROCEDURE — 1159F MED LIST DOCD IN RCRD: CPT | Performed by: NURSE PRACTITIONER

## 2024-06-10 PROCEDURE — 3078F DIAST BP <80 MM HG: CPT | Performed by: NURSE PRACTITIONER

## 2024-06-10 RX ORDER — AZITHROMYCIN 250 MG/1
TABLET, FILM COATED ORAL
Qty: 6 TABLET | Refills: 0 | Status: SHIPPED | OUTPATIENT
Start: 2024-06-10

## 2024-06-10 ASSESSMENT — ENCOUNTER SYMPTOMS
VOMITING: 0
ENDOCRINE NEGATIVE: 1
VOICE CHANGE: 1
NAUSEA: 0
BACK PAIN: 1
UNEXPECTED WEIGHT CHANGE: 0
WHEEZING: 0
PSYCHIATRIC NEGATIVE: 1
GASTROINTESTINAL NEGATIVE: 1
CHILLS: 0
EYES NEGATIVE: 1
PALPITATIONS: 0
SORE THROAT: 0
DIARRHEA: 0
SPEECH DIFFICULTY: 0
SHORTNESS OF BREATH: 0
CONSTIPATION: 0
ABDOMINAL PAIN: 0
HEADACHES: 0
COUGH: 0
RHINORRHEA: 1
FATIGUE: 0
ACTIVITY CHANGE: 0
HEMATOLOGIC/LYMPHATIC NEGATIVE: 1
ALLERGIC/IMMUNOLOGIC NEGATIVE: 1
DIZZINESS: 0
ARTHRALGIAS: 1
NUMBNESS: 0

## 2024-06-10 NOTE — PROGRESS NOTES
Subjective   Patient ID: Prashant Rapp is a 74 y.o. male who presents for Results, Sinusitis (Sinus drainage, raspy throat, cough with some congestion x 5 days, covid negative on ), and Hypertension (Would like a 30 day refill in October to sync amlodipine with other meds).    C/o sinus congestion, drainage, scratchy throat x 5 days.  Tested negative for COVID .  Denies fever, chills, n/v, diarrhea, chest pain, dyspnea.  Sinusitis, start Z-Vj.  Dragon viral  Patient is very active, goes to [facility] for cardio and weights.  Allergic rhinitis, taking Xyzal as needed. Chronic nasal congestion, patient had radiofrequency application to the inferior nasal turbinates  Following with Dr. Rice for BPH, ED. Taking tadalafil 5 mg daily  Hypertension, controlled with amlodipine 10 mg daily, losartan-HCTZ 100-25 mg daily.   Cervicalgia, controlled with NSAIDs.   Bilateral hearing loss, saw audiology, asymmetric SNHL, bilateral hearing aids.  Follows with Dr. Romero for dermatology.  Intermittent left knee pain. Recommend compression sleeve. Tylenol as needed    Preventive:  CRC screen: 2018  PSA: 2023--1.8  CT cardiac scorin2018--17.9        The ASCVD Risk score (Mary Carmen DK, et al., 2019) failed to calculate for the following reasons:    The valid total cholesterol range is 130 to 320 mg/dL    Lab on 2024   Component Date Value Ref Range Status    WBC 2024 6.4  4.4 - 11.3 x10*3/uL Final    nRBC 2024 0.0  0.0 - 0.0 /100 WBCs Final    RBC 2024 4.96  4.50 - 5.90 x10*6/uL Final    Hemoglobin 2024 15.6  13.5 - 17.5 g/dL Final    Hematocrit 2024 45.8  41.0 - 52.0 % Final    MCV 2024 92  80 - 100 fL Final    MCH 2024 31.5  26.0 - 34.0 pg Final    MCHC 2024 34.1  32.0 - 36.0 g/dL Final    RDW 2024 13.1  11.5 - 14.5 % Final    Platelets 2024 250  150 - 450 x10*3/uL Final    Neutrophils % 2024 62.7  40.0 - 80.0 % Final    Immature Granulocytes  %, Automated 05/16/2024 1.1 (H)  0.0 - 0.9 % Final    Immature Granulocyte Count (IG) includes promyelocytes, myelocytes and metamyelocytes but does not include bands. Percent differential counts (%) should be interpreted in the context of the absolute cell counts (cells/UL).    Lymphocytes % 05/16/2024 22.8  13.0 - 44.0 % Final    Monocytes % 05/16/2024 11.3  2.0 - 10.0 % Final    Eosinophils % 05/16/2024 1.6  0.0 - 6.0 % Final    Basophils % 05/16/2024 0.5  0.0 - 2.0 % Final    Neutrophils Absolute 05/16/2024 4.00  1.60 - 5.50 x10*3/uL Final    Percent differential counts (%) should be interpreted in the context of the absolute cell counts (cells/uL).    Immature Granulocytes Absolute, Au* 05/16/2024 0.07  0.00 - 0.50 x10*3/uL Final    Lymphocytes Absolute 05/16/2024 1.45  0.80 - 3.00 x10*3/uL Final    Monocytes Absolute 05/16/2024 0.72  0.05 - 0.80 x10*3/uL Final    Eosinophils Absolute 05/16/2024 0.10  0.00 - 0.40 x10*3/uL Final    Basophils Absolute 05/16/2024 0.03  0.00 - 0.10 x10*3/uL Final    Glucose 05/16/2024 95  74 - 99 mg/dL Final    Sodium 05/16/2024 139  136 - 145 mmol/L Final    Potassium 05/16/2024 4.3  3.5 - 5.3 mmol/L Final    Chloride 05/16/2024 102  98 - 107 mmol/L Final    Bicarbonate 05/16/2024 30  21 - 32 mmol/L Final    Anion Gap 05/16/2024 11  10 - 20 mmol/L Final    Urea Nitrogen 05/16/2024 17  6 - 23 mg/dL Final    Creatinine 05/16/2024 0.93  0.50 - 1.30 mg/dL Final    eGFR 05/16/2024 88  >60 mL/min/1.73m*2 Final    Calculations of estimated GFR are performed using the 2021 CKD-EPI Study Refit equation without the race variable for the IDMS-Traceable creatinine methods.  https://jasn.asnjournals.org/content/early/2021/09/22/ASN.1786341347    Calcium 05/16/2024 10.1  8.6 - 10.3 mg/dL Final    Albumin 05/16/2024 4.8  3.4 - 5.0 g/dL Final    Alkaline Phosphatase 05/16/2024 65  33 - 136 U/L Final    Total Protein 05/16/2024 7.6  6.4 - 8.2 g/dL Final    AST 05/16/2024 22  9 - 39 U/L Final     Bilirubin, Total 05/16/2024 0.6  0.0 - 1.2 mg/dL Final    ALT 05/16/2024 15  10 - 52 U/L Final    Patients treated with Sulfasalazine may generate falsely decreased results for ALT.    Cholesterol 05/16/2024 121  0 - 199 mg/dL Final          Age      Desirable   Borderline High   High     0-19 Y     0 - 169       170 - 199     >/= 200    20-24 Y     0 - 189       190 - 224     >/= 225         >24 Y     0 - 199       200 - 239     >/= 240   **All ranges are based on fasting samples. Specific   therapeutic targets will vary based on patient-specific   cardiac risk.    Pediatric guidelines reference:Pediatrics 2011, 128(S5).Adult guidelines reference: NCEP ATPIII Guidelines,BLESSING 2001, 258:2486-97    Venipuncture immediately after or during the administration of Metamizole may lead to falsely low results. Testing should be performed immediately prior to Metamizole dosing.    HDL-Cholesterol 05/16/2024 51.0  mg/dL Final      Age       Very Low   Low     Normal    High    0-19 Y    < 35      < 40     40-45     ----  20-24 Y    ----     < 40      >45      ----        >24 Y      ----     < 40     40-60      >60      Cholesterol/HDL Ratio 05/16/2024 2.4   Final      Ref Values  Desirable  < 3.4  High Risk  > 5.0    LDL Calculated 05/16/2024 49  <=99 mg/dL Final                                Near   Borderline      AGE      Desirable  Optimal    High     High     Very High     0-19 Y     0 - 109     ---    110-129   >/= 130     ----    20-24 Y     0 - 119     ---    120-159   >/= 160     ----      >24 Y     0 -  99   100-129  130-159   160-189     >/=190      VLDL 05/16/2024 21  0 - 40 mg/dL Final    Triglycerides 05/16/2024 106  0 - 149 mg/dL Final       Age         Desirable   Borderline High   High     Very High   0 D-90 D    19 - 174         ----         ----        ----  91 D- 9 Y     0 -  74        75 -  99     >/= 100      ----    10-19 Y     0 -  89        90 - 129     >/= 130      ----    20-24 Y     0 - 114        115 - 149     >/= 150      ----         >24 Y     0 - 149       150 - 199    200- 499    >/= 500    Venipuncture immediately after or during the administration of Metamizole may lead to falsely low results. Testing should be performed immediately prior to Metamizole dosing.    Non HDL Cholesterol 05/16/2024 70  0 - 149 mg/dL Final          Age       Desirable   Borderline High   High     Very High     0-19 Y     0 - 119       120 - 144     >/= 145    >/= 160    20-24 Y     0 - 149       150 - 189     >/= 190      ----         >24 Y    30 mg/dL above LDL Cholesterol goal      Thyroid Stimulating Hormone 05/16/2024 2.34  0.44 - 3.98 mIU/L Final    Vitamin D, 25-Hydroxy, Total 05/16/2024 50  30 - 100 ng/mL Final       Colonoscopy  Patient Name: Prashant Rapp  Procedure Date: 12/20/2018 1:37 PM  MRN: [id number]  Account Number: [id number]  YOB: 1950  Admit Type: Outpatient  Site: OR 1  Ethnicity: Not  or   Race: White  Attending MD: Yadiel Waddell MD, [id number]  Procedure:             Colonoscopy  Indications:           Screening for colorectal malignant neoplasm  Providers:             Yadiel Waddell MD Surgery, Gretchen Baltazar, MARIELOS ,                          Kimberly Kiser RN (1st Assisting Doctor), Carly Galaviz (Anesthesia Staff)  Referring:             Rhonda Cobb (Referring MD)  Medicines:             Propofol per Anesthesia  Complications:         No immediate complications.  Procedure:             Pre-Anesthesia Assessment:                         - Prior to the procedure, a History and Physical was                          performed, and patient medications and allergies were                          reviewed. The patient's tolerance of previous                          anesthesia was also reviewed. The risks and benefits                          of the procedure and the sedation options and risks                          were discussed with  the patient. All questions were                          answered, and informed consent was obtained. Prior                          Anticoagulants: The patient has taken no previous                          anticoagulant or antiplatelet agents. ASA Grade                          Assessment: I - A normal, healthy patient. After                          reviewing the risks and benefits, the patient was                          deemed in satisfactory condition to undergo the                          procedure.                         After I obtained informed consent, the scope was                          passed under direct vision. Throughout the procedure,                          the patient's blood pressure, pulse, and oxygen                          saturations were monitored continuously. The                          Colonoscope was introduced through the anus and                          advanced to the cecum, identified by appendiceal                          orifice and ileocecal valve. The colonoscopy was                          performed without difficulty. The patient tolerated                          the procedure well. The quality of the bowel                          preparation was evaluated using the BBPS ([place] Bowel                          Preparation Scale) with scores of: Right Colon = 2                          (minor amount of residual staining, small fragments of                          stool and/or opaque liquid, but mucosa seen well),                          Transverse Colon = 2 (minor amount of residual                          staining, small fragments of stool and/or opaque                          liquid, but mucosa seen well) and Left Colon = 2                          (minor amount of residual staining, small fragments of                          stool and/or opaque liquid, but mucosa seen well). The                          total BBPS score equals 6. The ileocecal valve,                           appendiceal orifice, and rectum were photographed.  Findings:       The perianal and digital rectal examinations were normal.       No other significant abnormalities were identified in a careful        examination of the remainder of the colon.  Moderate Sedation:       n/a  Estimated Blood Loss:       Estimated blood loss: none.  Impression:            - No specimens collected.  Recommendation:        - Patient has a contact number available for                          emergencies. The signs and symptoms of potential                          delayed complications were discussed with the patient.                          Return to normal activities tomorrow. Written                          discharge instructions were provided to the patient.                         - Resume previous diet.                         - Continue present medications.                         - Repeat colonoscopy in 10 years for surveillance.                         - Return to primary care physician PRN.  Procedure Code(s):     --- Professional ---                         91989  Diagnosis Code(s):     --- Professional ---                         Z12.11  CPT copyright 2021 American Medical Association. All rights reserved.  The codes documented in this report are preliminary and upon  review may   be revised to meet current compliance requirements.  Attending Participation:       I personally performed the entire procedure.  Yadiel Waddell MD  12/20/2018 2:20:50 PM  This report has been signed electronically.  Number of Addenda: 0  Note Initiated On: 12/20/2018 1:37 PM  Scope Withdrawal Time 0 hours 11 minutes 49 seconds   Total Procedure Duration Time 0 hours 21 minutes 4 seconds        Review of Systems   Constitutional:  Negative for activity change, chills, fatigue and unexpected weight change.   HENT:  Positive for congestion, hearing loss, rhinorrhea and voice change. Negative for ear pain and sore throat.     Eyes: Negative.    Respiratory:  Negative for cough, shortness of breath and wheezing.    Cardiovascular:  Negative for chest pain, palpitations and leg swelling.   Gastrointestinal: Negative.  Negative for abdominal pain, constipation, diarrhea, nausea and vomiting.   Endocrine: Negative.    Genitourinary: Negative.    Musculoskeletal:  Positive for arthralgias and back pain.   Allergic/Immunologic: Negative.    Neurological:  Negative for dizziness, speech difficulty, numbness and headaches.   Hematological: Negative.    Psychiatric/Behavioral: Negative.     All other systems reviewed and are negative.      Objective   Visit Vitals  /68   Pulse 62   Temp 35.8 °C (96.5 °F)   Wt 86.2 kg (190 lb 1.6 oz)   SpO2 97%   BMI 27.67 kg/m²   Smoking Status Never   BSA 2.06 m²      Physical Exam  Vitals and nursing note reviewed.   Constitutional:       General: He is not in acute distress.     Appearance: Normal appearance. He is normal weight. He is not ill-appearing.   HENT:      Head: Normocephalic and atraumatic.      Right Ear: Tympanic membrane, ear canal and external ear normal.      Left Ear: Tympanic membrane, ear canal and external ear normal.      Nose: Nose normal.      Mouth/Throat:      Mouth: Mucous membranes are moist.      Pharynx: Oropharynx is clear.   Eyes:      Extraocular Movements: Extraocular movements intact.      Conjunctiva/sclera: Conjunctivae normal.      Pupils: Pupils are equal, round, and reactive to light.   Cardiovascular:      Rate and Rhythm: Normal rate and regular rhythm.      Pulses: Normal pulses.      Heart sounds: Normal heart sounds. No murmur heard.  Pulmonary:      Effort: Pulmonary effort is normal. No respiratory distress.      Breath sounds: Normal breath sounds. No wheezing.   Abdominal:      General: Bowel sounds are normal. There is no distension.      Palpations: Abdomen is soft.      Tenderness: There is no abdominal tenderness.   Musculoskeletal:         General:  No tenderness. Normal range of motion.      Cervical back: Normal range of motion and neck supple.      Right lower leg: No edema.      Left lower leg: No edema.   Skin:     General: Skin is warm and dry.      Capillary Refill: Capillary refill takes less than 2 seconds.   Neurological:      General: No focal deficit present.      Mental Status: He is alert and oriented to person, place, and time.   Psychiatric:         Mood and Affect: Mood normal.         Behavior: Behavior normal.         Thought Content: Thought content normal.         Judgment: Judgment normal.         Assessment/Plan   Prashant was seen today for results, sinusitis and hypertension.  Diagnoses and all orders for this visit:  Acute non-recurrent sinusitis, unspecified location (Primary)  -     azithromycin (Zithromax) 250 mg tablet; TAKE TWO TABLETS ON DAY ONE AND ONE TABLET DAILY FOR FOUR DAYS AFTER  Benign hypertension  Hypercholesteremia  Allergic rhinitis, unspecified seasonality, unspecified trigger  BMI 27.0-27.9,adult  Other orders  -     Follow Up In Primary Care - Medicare Annual; Future    # Sinusitis  -Start Z-Vj  -Saline nasal spray as needed  -Tylenol 650 mg every 8 hours as needed for pain  -OTC Robitussin or Mucinex according to package directions as needed for cough  -Drink plenty of fluids  -Get plenty of rest  -Call the office if no improvement or worsens  # Hypertension, controlled  -Continue amlodipine 10 mg daily, losartan-HCTZ 100-25 mg daily  -Low fat/cholesterol, low sodium, low carbohydrate diet  -Exercise as tolerated 150 minutes/week, increase activity slowly  -Maintain healthy weight  # Hypercholesteremia, controlled  -Continue atorvastatin 20 mg daily  -Low fat/low cholesterol diet  -Eat more fresh foods  -Avoid processed/prepackaged/fast foods  -Gradually increase physical activity  -Weight loss  # Neck pain, improved  -Follow with Ortho as needed  -Stay active  # ED and BPH  -Follow-up with urology  -Taking tadalafil  5 mg daily    Follow-up 6 months for Medicare physical and as needed

## 2024-08-27 DIAGNOSIS — I10 BENIGN HYPERTENSION: ICD-10-CM

## 2024-08-27 RX ORDER — AMLODIPINE BESYLATE 10 MG/1
10 TABLET ORAL DAILY
Qty: 30 TABLET | Refills: 0 | Status: SHIPPED | OUTPATIENT
Start: 2024-08-27

## 2024-09-30 DIAGNOSIS — I10 BENIGN HYPERTENSION: ICD-10-CM

## 2024-09-30 DIAGNOSIS — E78.00 HYPERCHOLESTEREMIA: ICD-10-CM

## 2024-09-30 RX ORDER — ATORVASTATIN CALCIUM 20 MG/1
20 TABLET, FILM COATED ORAL NIGHTLY
Qty: 90 TABLET | Refills: 1 | Status: SHIPPED | OUTPATIENT
Start: 2024-09-30

## 2024-09-30 RX ORDER — AMLODIPINE BESYLATE 10 MG/1
10 TABLET ORAL DAILY
Qty: 90 TABLET | Refills: 1 | Status: SHIPPED | OUTPATIENT
Start: 2024-09-30

## 2024-09-30 RX ORDER — LOSARTAN POTASSIUM AND HYDROCHLOROTHIAZIDE 25; 100 MG/1; MG/1
1 TABLET ORAL DAILY
Qty: 90 TABLET | Refills: 1 | Status: SHIPPED | OUTPATIENT
Start: 2024-09-30

## 2024-11-05 ENCOUNTER — APPOINTMENT (OUTPATIENT)
Dept: PRIMARY CARE | Facility: CLINIC | Age: 74
End: 2024-11-05
Payer: MEDICARE

## 2024-11-05 VITALS
HEART RATE: 67 BPM | WEIGHT: 188.5 LBS | BODY MASS INDEX: 26.99 KG/M2 | SYSTOLIC BLOOD PRESSURE: 148 MMHG | OXYGEN SATURATION: 98 % | DIASTOLIC BLOOD PRESSURE: 90 MMHG | HEIGHT: 70 IN | TEMPERATURE: 95.9 F

## 2024-11-05 DIAGNOSIS — L23.7 POISON IVY DERMATITIS: Primary | ICD-10-CM

## 2024-11-05 DIAGNOSIS — M54.2 CERVICALGIA: ICD-10-CM

## 2024-11-05 PROCEDURE — 3077F SYST BP >= 140 MM HG: CPT

## 2024-11-05 PROCEDURE — 3080F DIAST BP >= 90 MM HG: CPT

## 2024-11-05 PROCEDURE — 1159F MED LIST DOCD IN RCRD: CPT

## 2024-11-05 PROCEDURE — 1157F ADVNC CARE PLAN IN RCRD: CPT

## 2024-11-05 PROCEDURE — 1036F TOBACCO NON-USER: CPT

## 2024-11-05 PROCEDURE — 99204 OFFICE O/P NEW MOD 45 MIN: CPT

## 2024-11-05 PROCEDURE — 3008F BODY MASS INDEX DOCD: CPT

## 2024-11-05 RX ORDER — PREDNISONE 10 MG/1
10 TABLET ORAL 3 TIMES DAILY
Qty: 9 TABLET | Refills: 0 | Status: SHIPPED | OUTPATIENT
Start: 2024-11-05 | End: 2024-11-08

## 2024-11-05 RX ORDER — BISMUTH SUBSALICYLATE 262 MG
1 TABLET,CHEWABLE ORAL DAILY
COMMUNITY

## 2024-11-05 NOTE — PROGRESS NOTES
Subjective   Patient ID: Prashant Rapp is a 74 y.o. male who presents for New Patient Visit, Rash (Poison ivy rash), and Numbness (Every once in a while he states he gets a prickly tingling feeling in his right shoulder neck area. Hasn't happened in a while).  Rash  Patient presents for evaluation of a rash involving the upper extremities. Rash started a few days ago. Lesions are red, and raised in texture. Rash has changed over time. Rash is pruritic. Associated symptoms: none. Patient denies: fever. Patient has had contacts with similar rash. Patient has had new exposures (soaps, lotions, laundry detergents, foods, medications, plants, insects or animals).    Poison Ivy Dermatitis - steroid taper    Subjective  Prashant Rapp is a 74 y.o. male who presents for evaluation and treatment of neck pain. Onset of symptoms was several months ago, unchanged since that time. Current symptoms are numbness in right side of neck radiating to AC joint of shoulder. Patient reports numbness in the upper extremities (right shoulder). Patient denies pain and weakness in the upper extremities. Event that precipitated these symptoms: this is a longstanding problem which waxes and wanes. Patient has had recurrent self limited episodes of neck pain in the past. Previous treatments include: physical therapy and chiropractor/manipulation.    Objective  [unfilled]     X-ray of the cervical spine:   Not indicated    Assessment/Plan  Cervical strain.  Severity of pain: mild    Discussed appropriate exercises.  Discussed appropriate use of ice and heat.  NSAIDs per medication orders.          Vitals:    11/05/24 0833   BP: 148/90   Pulse: 67   Temp: 35.5 °C (95.9 °F)   SpO2: 98%       Review of Systems    Objective   Physical Exam    Assessment/Plan   Problem List Items Addressed This Visit    None  Visit Diagnoses       Poison ivy dermatitis    -  Primary    Relevant Medications    predniSONE (Deltasone) 10 mg tablet    Cervicalgia                      Thank you for coming in today, please call my office if you have any concerns or questions.     Jose Roberto PALACIOS, CNP

## 2024-11-05 NOTE — PATIENT INSTRUCTIONS
Steroid burst for the poison ivy  See for medicare wellness in the future    Cervical XR if neck numbness and tingling is no better.    Thank you for coming in today, if any questions or concerns arise, please call my office.   SUZANNE Vazquez-CNP

## 2024-11-18 ENCOUNTER — LAB (OUTPATIENT)
Dept: LAB | Facility: LAB | Age: 74
End: 2024-11-18
Payer: MEDICARE

## 2024-11-18 DIAGNOSIS — Z12.5 PROSTATE CANCER SCREENING: ICD-10-CM

## 2024-11-18 LAB — PSA SERPL-MCNC: 1.45 NG/ML

## 2024-11-18 PROCEDURE — G0103 PSA SCREENING: HCPCS

## 2024-11-22 NOTE — PROGRESS NOTES
Subjective   Patient ID: Prashant Rapp is a 74 y.o. male.    HPI  72 yo male F/U ED and BPH. PSA rising.      He continues tadalafil 5 mg daily, he continues to have hesitancy when he waits too long to urinate.     One episode of nocturia per night.     His partner has been experiencing UTIs.     Renal US from 11/03/24 showed:  IMPRESSION:  1. Simple left renal cyst measuring up to 1.8 cm. Otherwise  unremarkable sonographic evaluation of the bilateral kidneys.  2. Normal postvoid residual of 12 cc.  3. Prostate with a calculated volume of 21 cc, within normal limits.    Lab Results   Component Value Date    PSA 1.45 11/18/2024    PSA 1.8 11/16/2023    PSA 3.0 11/15/2022       Review of Systems   All other systems reviewed and are negative.      Objective   Physical Exam  Vitals reviewed.         Assessment/Plan     72 yo male F/U ED and BPH. PSA rising. PSA 3 November 2022.      He continues tadalafil 5 mg, 1 tab daily as a treatment for both, with acceptable response for urinary symptoms in addition to sexual function.    We discussed rising PSA. We will order another PSA in 1 year with follow up afterwards. I have discussed the reasoning behind ordering an MRI prostate if his PSA continue to rise. I have refilled his Cialis 5 mg daily due to the improvements.     Plan:   Repeat PSA 1 year   FUV 1 year   MRI prostate to perform if PSA continues to go up.   Refill Cialis 5 mg.       Diagnoses and all orders for this visit:  Benign localized prostatic hyperplasia with lower urinary tract symptoms (LUTS)  Erectile dysfunction, unspecified erectile dysfunction type  Elevated PSA measurement  -     Prostate Specific Antigen; Future  Benign prostatic hyperplasia, unspecified whether lower urinary tract symptoms present  -     tadalafil (Cialis) 5 mg tablet; Take 1 tablet (5 mg) by mouth once daily.    Scribe Attestation  By signing my name below, IMadonna Scribe attest that this documentation has been  prepared under the direction and in the presence of Mayra Rice MD.    Scribe Attestation  By signing my name below, I, Elizabet Fontenot   attest that this documentation has been prepared under the direction and in the presence of Mayra Rice MD.

## 2024-11-27 ENCOUNTER — APPOINTMENT (OUTPATIENT)
Dept: UROLOGY | Facility: CLINIC | Age: 74
End: 2024-11-27
Payer: MEDICARE

## 2024-11-27 DIAGNOSIS — N40.0 BENIGN PROSTATIC HYPERPLASIA, UNSPECIFIED WHETHER LOWER URINARY TRACT SYMPTOMS PRESENT: ICD-10-CM

## 2024-11-27 DIAGNOSIS — N40.1 BENIGN LOCALIZED PROSTATIC HYPERPLASIA WITH LOWER URINARY TRACT SYMPTOMS (LUTS): Primary | ICD-10-CM

## 2024-11-27 DIAGNOSIS — R97.20 ELEVATED PSA MEASUREMENT: ICD-10-CM

## 2024-11-27 DIAGNOSIS — N52.9 ERECTILE DYSFUNCTION, UNSPECIFIED ERECTILE DYSFUNCTION TYPE: ICD-10-CM

## 2024-11-27 PROCEDURE — 99213 OFFICE O/P EST LOW 20 MIN: CPT | Performed by: STUDENT IN AN ORGANIZED HEALTH CARE EDUCATION/TRAINING PROGRAM

## 2024-11-27 PROCEDURE — G2211 COMPLEX E/M VISIT ADD ON: HCPCS | Performed by: STUDENT IN AN ORGANIZED HEALTH CARE EDUCATION/TRAINING PROGRAM

## 2024-11-27 PROCEDURE — 1157F ADVNC CARE PLAN IN RCRD: CPT | Performed by: STUDENT IN AN ORGANIZED HEALTH CARE EDUCATION/TRAINING PROGRAM

## 2024-11-27 RX ORDER — TADALAFIL 5 MG/1
5 TABLET ORAL DAILY
Qty: 90 TABLET | Refills: 3 | Status: SHIPPED | OUTPATIENT
Start: 2024-11-27 | End: 2025-11-27

## 2024-12-10 ENCOUNTER — APPOINTMENT (OUTPATIENT)
Dept: PRIMARY CARE | Facility: CLINIC | Age: 74
End: 2024-12-10
Payer: MEDICARE

## 2025-03-13 DIAGNOSIS — N40.0 BENIGN PROSTATIC HYPERPLASIA, UNSPECIFIED WHETHER LOWER URINARY TRACT SYMPTOMS PRESENT: ICD-10-CM

## 2025-03-13 RX ORDER — TADALAFIL 5 MG/1
5 TABLET ORAL DAILY
Qty: 90 TABLET | Refills: 3 | Status: SHIPPED | OUTPATIENT
Start: 2025-03-13 | End: 2026-03-13

## 2025-05-06 ENCOUNTER — APPOINTMENT (OUTPATIENT)
Dept: PRIMARY CARE | Facility: CLINIC | Age: 75
End: 2025-05-06
Payer: MEDICARE

## 2025-05-06 VITALS
HEIGHT: 70 IN | BODY MASS INDEX: 27.5 KG/M2 | SYSTOLIC BLOOD PRESSURE: 130 MMHG | TEMPERATURE: 95.8 F | OXYGEN SATURATION: 96 % | WEIGHT: 192.1 LBS | DIASTOLIC BLOOD PRESSURE: 70 MMHG | HEART RATE: 67 BPM

## 2025-05-06 DIAGNOSIS — Z13.6 SCREENING FOR CARDIOVASCULAR CONDITION: ICD-10-CM

## 2025-05-06 DIAGNOSIS — R79.9 ABNORMAL FINDING OF BLOOD CHEMISTRY, UNSPECIFIED: ICD-10-CM

## 2025-05-06 DIAGNOSIS — Z12.5 SCREENING FOR PROSTATE CANCER: ICD-10-CM

## 2025-05-06 DIAGNOSIS — Z00.00 HEALTHCARE MAINTENANCE: ICD-10-CM

## 2025-05-06 DIAGNOSIS — I10 BENIGN HYPERTENSION: ICD-10-CM

## 2025-05-06 DIAGNOSIS — R04.0 EPISTAXIS: ICD-10-CM

## 2025-05-06 DIAGNOSIS — E78.00 HYPERCHOLESTEREMIA: ICD-10-CM

## 2025-05-06 DIAGNOSIS — Z00.00 ROUTINE GENERAL MEDICAL EXAMINATION AT A HEALTH CARE FACILITY: ICD-10-CM

## 2025-05-06 DIAGNOSIS — E55.9 VITAMIN D DEFICIENCY: ICD-10-CM

## 2025-05-06 DIAGNOSIS — Z13.0 SCREENING FOR DEFICIENCY ANEMIA: ICD-10-CM

## 2025-05-06 DIAGNOSIS — Z13.89 SCREENING FOR MULTIPLE CONDITIONS: ICD-10-CM

## 2025-05-06 DIAGNOSIS — Z13.1 DIABETES MELLITUS SCREENING: ICD-10-CM

## 2025-05-06 DIAGNOSIS — Z12.5 PROSTATE CANCER SCREENING: ICD-10-CM

## 2025-05-06 DIAGNOSIS — Z00.00 ROUTINE GENERAL MEDICAL EXAMINATION AT HEALTH CARE FACILITY: Primary | ICD-10-CM

## 2025-05-06 DIAGNOSIS — Z71.89 CARDIAC RISK COUNSELING: ICD-10-CM

## 2025-05-06 DIAGNOSIS — Z13.220 SCREENING FOR HYPERLIPIDEMIA: ICD-10-CM

## 2025-05-06 DIAGNOSIS — R82.90 ABNORMAL FINDING ON URINALYSIS: ICD-10-CM

## 2025-05-06 DIAGNOSIS — Z72.89 OTHER PROBLEMS RELATED TO LIFESTYLE: ICD-10-CM

## 2025-05-06 DIAGNOSIS — Z23 NEED FOR VACCINATION: ICD-10-CM

## 2025-05-06 DIAGNOSIS — Z13.1 SCREENING FOR DIABETES MELLITUS: ICD-10-CM

## 2025-05-06 DIAGNOSIS — Z13.29 THYROID DISORDER SCREENING: ICD-10-CM

## 2025-05-06 LAB
POC ALBUMIN /CREATININE RATIO MANUALLY ENTERED: <30 UG/MG CREAT
POC APPEARANCE, URINE: CLEAR
POC BILIRUBIN, URINE: NEGATIVE
POC BLOOD, URINE: ABNORMAL
POC COLOR, URINE: YELLOW
POC GLUCOSE, URINE: NEGATIVE MG/DL
POC KETONES, URINE: NEGATIVE MG/DL
POC LEUKOCYTES, URINE: ABNORMAL
POC NITRITE,URINE: NEGATIVE
POC PH, URINE: 7 PH
POC PROTEIN, URINE: NEGATIVE MG/DL
POC SPECIFIC GRAVITY, URINE: 1.01
POC URINE ALBUMIN: 10 MG/L
POC URINE CREATININE: 200 MG/DL
POC UROBILINOGEN, URINE: 0.2 EU/DL

## 2025-05-06 PROCEDURE — 82044 UR ALBUMIN SEMIQUANTITATIVE: CPT

## 2025-05-06 PROCEDURE — 1160F RVW MEDS BY RX/DR IN RCRD: CPT

## 2025-05-06 PROCEDURE — 1036F TOBACCO NON-USER: CPT

## 2025-05-06 PROCEDURE — 3078F DIAST BP <80 MM HG: CPT

## 2025-05-06 PROCEDURE — 99397 PER PM REEVAL EST PAT 65+ YR: CPT

## 2025-05-06 PROCEDURE — 3008F BODY MASS INDEX DOCD: CPT

## 2025-05-06 PROCEDURE — G0446 INTENS BEHAVE THER CARDIO DX: HCPCS

## 2025-05-06 PROCEDURE — 1157F ADVNC CARE PLAN IN RCRD: CPT

## 2025-05-06 PROCEDURE — 99497 ADVNCD CARE PLAN 30 MIN: CPT

## 2025-05-06 PROCEDURE — G0442 ANNUAL ALCOHOL SCREEN 15 MIN: HCPCS

## 2025-05-06 PROCEDURE — 81003 URINALYSIS AUTO W/O SCOPE: CPT

## 2025-05-06 PROCEDURE — G0444 DEPRESSION SCREEN ANNUAL: HCPCS

## 2025-05-06 PROCEDURE — 1170F FXNL STATUS ASSESSED: CPT

## 2025-05-06 PROCEDURE — 1159F MED LIST DOCD IN RCRD: CPT

## 2025-05-06 PROCEDURE — G0439 PPPS, SUBSEQ VISIT: HCPCS

## 2025-05-06 PROCEDURE — 3075F SYST BP GE 130 - 139MM HG: CPT

## 2025-05-06 RX ORDER — MUPIROCIN 20 MG/G
OINTMENT TOPICAL 3 TIMES DAILY
Qty: 22 G | Refills: 0 | Status: SHIPPED | OUTPATIENT
Start: 2025-05-06 | End: 2025-05-16

## 2025-05-06 RX ORDER — AMLODIPINE BESYLATE 10 MG/1
10 TABLET ORAL DAILY
Qty: 90 TABLET | Refills: 1 | Status: SHIPPED | OUTPATIENT
Start: 2025-05-06

## 2025-05-06 RX ORDER — ATORVASTATIN CALCIUM 20 MG/1
20 TABLET, FILM COATED ORAL NIGHTLY
Qty: 90 TABLET | Refills: 1 | Status: SHIPPED | OUTPATIENT
Start: 2025-05-06

## 2025-05-06 RX ORDER — LOSARTAN POTASSIUM AND HYDROCHLOROTHIAZIDE 25; 100 MG/1; MG/1
1 TABLET ORAL DAILY
Qty: 90 TABLET | Refills: 1 | Status: SHIPPED | OUTPATIENT
Start: 2025-05-06

## 2025-05-06 ASSESSMENT — ENCOUNTER SYMPTOMS
DEPRESSION: 0
LOSS OF SENSATION IN FEET: 0
OCCASIONAL FEELINGS OF UNSTEADINESS: 0

## 2025-05-06 ASSESSMENT — PATIENT HEALTH QUESTIONNAIRE - PHQ9
1. LITTLE INTEREST OR PLEASURE IN DOING THINGS: NOT AT ALL
SUM OF ALL RESPONSES TO PHQ9 QUESTIONS 1 AND 2: 0
2. FEELING DOWN, DEPRESSED OR HOPELESS: NOT AT ALL

## 2025-05-06 ASSESSMENT — ACTIVITIES OF DAILY LIVING (ADL)
TAKING_MEDICATION: INDEPENDENT
DOING_HOUSEWORK: INDEPENDENT
BATHING: INDEPENDENT
DRESSING: INDEPENDENT
MANAGING_FINANCES: INDEPENDENT
GROCERY_SHOPPING: INDEPENDENT

## 2025-05-06 NOTE — PROGRESS NOTES
"Subjective   Reason for Visit: Prashant Rapp is an 74 y.o. male here for a Medicare Wellness visit.     Past Medical, Surgical, and Family History reviewed and updated in chart.    Reviewed all medications by prescribing practitioner or clinical pharmacist (such as prescriptions, OTCs, herbal therapies and supplements) and documented in the medical record.    Medicare annual wellness  UTD on vaccinations with exception of tdap, he does work outdoors    Exercising at [employer] at least twice to 3x weekly  Sees urology for BPH    Epistaxis over the weekend, scarring with scab present on medial turbinate of right nare  Mupirocin    Labs due today and urine.        Exercising twice weekly, cardio for 30 minutes, elliptical, legs,     Patient Care Team:  BALWINDER Vazquez as PCP - General (Family Medicine)  BALWINDER Vazquez as PCP - [employer] PCP     Review of Systems    Objective   Vitals:  /70   Pulse 67   Temp 35.4 °C (95.8 °F)   Ht 1.772 m (5' 9.75\")   Wt 87.1 kg (192 lb 1.6 oz)   SpO2 96%   BMI 27.76 kg/m²       Physical Exam  Vitals and nursing note reviewed.   Constitutional:       Appearance: Normal appearance.   HENT:      Head: Normocephalic.      Mouth/Throat:      Mouth: Mucous membranes are moist.   Cardiovascular:      Rate and Rhythm: Normal rate and regular rhythm.      Pulses: Normal pulses.      Heart sounds: Normal heart sounds. No murmur heard.     No friction rub. No gallop.   Pulmonary:      Effort: Pulmonary effort is normal. No respiratory distress.      Breath sounds: Normal breath sounds. No wheezing.   Abdominal:      General: Bowel sounds are normal. There is no distension.      Palpations: Abdomen is soft.      Tenderness: There is no abdominal tenderness.   Musculoskeletal:         General: No deformity. Normal range of motion.   Skin:     General: Skin is warm and dry.      Capillary Refill: Capillary refill takes less than 2 seconds. "   Neurological:      General: No focal deficit present.      Mental Status: He is alert and oriented to person, place, and time.   Psychiatric:         Mood and Affect: Mood normal.         Assessment & Plan  Benign hypertension    Orders:    amLODIPine (Norvasc) 10 mg tablet; Take 1 tablet (10 mg) by mouth once daily.    losartan-hydrochlorothiazide (Hyzaar) 100-25 mg tablet; Take 1 tablet by mouth once daily.    Hypercholesteremia    Orders:    atorvastatin (Lipitor) 20 mg tablet; Take 1 tablet (20 mg) by mouth once daily at bedtime.    Routine general medical examination at health care facility    Orders:    1 Year Follow Up In Primary Care - Wellness Exam; Future    Screening for multiple conditions    Orders:    CBC and Auto Differential    Diabetes mellitus screening    Orders:    Hemoglobin A1C    Screening for cardiovascular condition         Cardiac risk counseling         Need for vaccination         Other problems related to lifestyle         Routine general medical examination at a health care facility         Screening for diabetes mellitus    Orders:    Albumin-Creatinine Ratio, Urine Random    POCT Albumin random urine manually resulted    POCT UA Automated manually resulted    Screening for hyperlipidemia    Orders:    Lipid Panel    Screening for prostate cancer         Vitamin D deficiency    Orders:    Vitamin D 25-Hydroxy,Total (for eval of Vitamin D levels)    Healthcare maintenance    Orders:    Comprehensive Metabolic Panel    Thyroid disorder screening    Orders:    TSH with reflex to Free T4 if abnormal    Screening for deficiency anemia    Orders:    Vitamin B12    Prostate cancer screening         Abnormal finding of blood chemistry, unspecified    Orders:    CBC and Auto Differential    Epistaxis    Orders:    mupirocin (Bactroban) 2 % ointment; Apply topically 3 times a day for 10 days. apply to affected area           Advance Directives Discussion  16 - 20 minutes were spent discussing  Advanced Care Planning (including a Living Will, Medical Power Of , as well as specific end of life choices and/or directives). The details of that discussion were documented in Advanced Directives Discussion section of the medical record.     Has advanced directives, but done years ago  POA is in place, wife and children.    Full Code status.    Alcohol Misuse Screen  5 - 10 minutes were spent screening the patient for alcohol misuse disorder.    Cardiac Risk Assessment  15 - 20 minutes were spent discussing Cardiovascular risk and, if needed, lifestyle modifications were recommended, including nutritional choices, exercise, and elimination of habits contributing to risk.   Aspirin use/disuse was discussed following the guidelines below:  low dose ASA ( mg) should be considered:    If prior Heart Attack/Stroke/Peripheral vascular disease:  Generally recommend daily low dose aspirin unless extremely high bleeding risk (e.g., gastrointestinal).    If no prior Heart Attack/Stroke/Peripheral vascular disease:              Age over 70: Do not use Aspirin for prevention    Age less than 70 and 10-year cardiovascular disease risk is >20%: use low dose Aspirin for prevention.    The ASCVD Risk score (Mary Carmen HDEZ, et al., 2019) failed to calculate for the following reasons:    The valid total cholesterol range is 130 to 320 mg/dL              Depression Screening  5 - 10 minutes were spent screening for depression.    Patient Health Questionnaire-2 Score: 0 (5/6/2025  8:43 AM)     Obesity Counseling  15 - 20 minutes were spent counseling on diet and exercise interventions to address obesity and weight reduction.

## 2025-05-06 NOTE — PATIENT INSTRUCTIONS
Labs today and urine studies  Mupirocin for the nosebleed    See every 6 months BP recheck follow up    Thank you for coming in today, if any questions or concerns arise, please call my office.   SUZANNE Vazquez-CNP

## 2025-05-06 NOTE — ASSESSMENT & PLAN NOTE
Orders:    amLODIPine (Norvasc) 10 mg tablet; Take 1 tablet (10 mg) by mouth once daily.    losartan-hydrochlorothiazide (Hyzaar) 100-25 mg tablet; Take 1 tablet by mouth once daily.

## 2025-05-08 LAB
25(OH)D3+25(OH)D2 SERPL-MCNC: 35 NG/ML (ref 30–100)
ALBUMIN SERPL-MCNC: 4.8 G/DL (ref 3.6–5.1)
ALBUMIN/CREAT UR: 4 MG/G CREAT
ALP SERPL-CCNC: 61 U/L (ref 35–144)
ALT SERPL-CCNC: 17 U/L (ref 9–46)
ANION GAP SERPL CALCULATED.4IONS-SCNC: 11 MMOL/L (CALC) (ref 7–17)
AST SERPL-CCNC: 22 U/L (ref 10–35)
BACTERIA UR CULT: NORMAL
BASOPHILS # BLD AUTO: 19 CELLS/UL (ref 0–200)
BASOPHILS NFR BLD AUTO: 0.3 %
BILIRUB SERPL-MCNC: 0.5 MG/DL (ref 0.2–1.2)
BUN SERPL-MCNC: 15 MG/DL (ref 7–25)
CALCIUM SERPL-MCNC: 9.7 MG/DL (ref 8.6–10.3)
CHLORIDE SERPL-SCNC: 101 MMOL/L (ref 98–110)
CHOLEST SERPL-MCNC: 123 MG/DL
CHOLEST/HDLC SERPL: 2.3 (CALC)
CO2 SERPL-SCNC: 27 MMOL/L (ref 20–32)
CREAT SERPL-MCNC: 0.78 MG/DL (ref 0.7–1.28)
CREAT UR-MCNC: 120 MG/DL (ref 20–320)
EGFRCR SERPLBLD CKD-EPI 2021: 95 ML/MIN/1.73M2
EOSINOPHIL # BLD AUTO: 99 CELLS/UL (ref 15–500)
EOSINOPHIL NFR BLD AUTO: 1.6 %
ERYTHROCYTE [DISTWIDTH] IN BLOOD BY AUTOMATED COUNT: 13 % (ref 11–15)
EST. AVERAGE GLUCOSE BLD GHB EST-MCNC: 128 MG/DL
EST. AVERAGE GLUCOSE BLD GHB EST-SCNC: 7.1 MMOL/L
GLUCOSE SERPL-MCNC: 80 MG/DL (ref 65–99)
HBA1C MFR BLD: 6.1 %
HCT VFR BLD AUTO: 43.5 % (ref 38.5–50)
HDLC SERPL-MCNC: 54 MG/DL
HGB BLD-MCNC: 14.4 G/DL (ref 13.2–17.1)
LDLC SERPL CALC-MCNC: 48 MG/DL (CALC)
LYMPHOCYTES # BLD AUTO: 1333 CELLS/UL (ref 850–3900)
LYMPHOCYTES NFR BLD AUTO: 21.5 %
MCH RBC QN AUTO: 30.4 PG (ref 27–33)
MCHC RBC AUTO-ENTMCNC: 33.1 G/DL (ref 32–36)
MCV RBC AUTO: 91.8 FL (ref 80–100)
MICROALBUMIN UR-MCNC: 0.5 MG/DL
MONOCYTES # BLD AUTO: 570 CELLS/UL (ref 200–950)
MONOCYTES NFR BLD AUTO: 9.2 %
NEUTROPHILS # BLD AUTO: 4179 CELLS/UL (ref 1500–7800)
NEUTROPHILS NFR BLD AUTO: 67.4 %
NONHDLC SERPL-MCNC: 69 MG/DL (CALC)
PLATELET # BLD AUTO: 246 THOUSAND/UL (ref 140–400)
PMV BLD REES-ECKER: 10.7 FL (ref 7.5–12.5)
POTASSIUM SERPL-SCNC: 4.2 MMOL/L (ref 3.5–5.3)
PROT SERPL-MCNC: 7.2 G/DL (ref 6.1–8.1)
RBC # BLD AUTO: 4.74 MILLION/UL (ref 4.2–5.8)
SODIUM SERPL-SCNC: 139 MMOL/L (ref 135–146)
TRIGL SERPL-MCNC: 128 MG/DL
TSH SERPL-ACNC: 1.83 MIU/L (ref 0.4–4.5)
VIT B12 SERPL-MCNC: 567 PG/ML (ref 200–1100)
WBC # BLD AUTO: 6.2 THOUSAND/UL (ref 3.8–10.8)

## 2025-05-09 NOTE — RESULT ENCOUNTER NOTE
Results are normal, please notify the patient. Normal. He is prediabetic monitor carb intake, continue efforts at healthy diet and exercise and recheck in 6 months.

## 2025-11-06 ENCOUNTER — APPOINTMENT (OUTPATIENT)
Dept: PRIMARY CARE | Facility: CLINIC | Age: 75
End: 2025-11-06
Payer: MEDICARE

## 2025-12-03 ENCOUNTER — APPOINTMENT (OUTPATIENT)
Dept: UROLOGY | Facility: CLINIC | Age: 75
End: 2025-12-03
Payer: MEDICARE

## 2025-12-05 ENCOUNTER — APPOINTMENT (OUTPATIENT)
Dept: UROLOGY | Facility: CLINIC | Age: 75
End: 2025-12-05
Payer: MEDICARE